# Patient Record
Sex: FEMALE | Race: WHITE | Employment: OTHER | URBAN - METROPOLITAN AREA
[De-identification: names, ages, dates, MRNs, and addresses within clinical notes are randomized per-mention and may not be internally consistent; named-entity substitution may affect disease eponyms.]

---

## 2022-03-22 ENCOUNTER — TELEPHONE (OUTPATIENT)
Dept: FAMILY MEDICINE CLINIC | Age: 87
End: 2022-03-22

## 2022-03-22 NOTE — TELEPHONE ENCOUNTER
Pt spouse is a new patient with [facility] and Catalino Mcwilliams referred her to the doctor. Wanting to know if the doctor can also accept her?     Thank you

## 2022-03-31 ENCOUNTER — OFFICE VISIT (OUTPATIENT)
Dept: FAMILY MEDICINE CLINIC | Age: 87
End: 2022-03-31
Payer: MEDICARE

## 2022-03-31 VITALS
HEIGHT: 56 IN | RESPIRATION RATE: 16 BRPM | SYSTOLIC BLOOD PRESSURE: 132 MMHG | HEART RATE: 64 BPM | BODY MASS INDEX: 22.54 KG/M2 | DIASTOLIC BLOOD PRESSURE: 80 MMHG | WEIGHT: 100.2 LBS | TEMPERATURE: 96.9 F | OXYGEN SATURATION: 97 %

## 2022-03-31 DIAGNOSIS — E78.00 HYPERCHOLESTEROLEMIA: ICD-10-CM

## 2022-03-31 DIAGNOSIS — G30.9 ALZHEIMER'S DISEASE WITH PARKINSON'S DISEASE (HCC): Primary | ICD-10-CM

## 2022-03-31 DIAGNOSIS — C50.912 MALIGNANT NEOPLASM OF LEFT FEMALE BREAST, UNSPECIFIED ESTROGEN RECEPTOR STATUS, UNSPECIFIED SITE OF BREAST (HCC): ICD-10-CM

## 2022-03-31 DIAGNOSIS — Z97.4 WEARS HEARING AID: ICD-10-CM

## 2022-03-31 DIAGNOSIS — F02.80 ALZHEIMER'S DISEASE WITH PARKINSON'S DISEASE (HCC): Primary | ICD-10-CM

## 2022-03-31 DIAGNOSIS — K59.09 OTHER CONSTIPATION: ICD-10-CM

## 2022-03-31 DIAGNOSIS — E03.9 ACQUIRED HYPOTHYROIDISM: ICD-10-CM

## 2022-03-31 DIAGNOSIS — I10 ESSENTIAL HYPERTENSION: ICD-10-CM

## 2022-03-31 DIAGNOSIS — Z86.73 HISTORY OF STROKE: ICD-10-CM

## 2022-03-31 DIAGNOSIS — G20 ALZHEIMER'S DISEASE WITH PARKINSON'S DISEASE (HCC): Primary | ICD-10-CM

## 2022-03-31 PROBLEM — I63.9 STROKE (CEREBRUM) (HCC): Status: ACTIVE | Noted: 2020-10-01

## 2022-03-31 PROBLEM — G20.A1 ALZHEIMER'S DISEASE WITH PARKINSON'S DISEASE (HCC): Status: ACTIVE | Noted: 2022-03-31

## 2022-03-31 PROBLEM — F41.8 DEPRESSION WITH ANXIETY: Status: ACTIVE | Noted: 2022-03-31

## 2022-03-31 PROCEDURE — G8427 DOCREV CUR MEDS BY ELIG CLIN: HCPCS | Performed by: FAMILY MEDICINE

## 2022-03-31 PROCEDURE — 1090F PRES/ABSN URINE INCON ASSESS: CPT | Performed by: FAMILY MEDICINE

## 2022-03-31 PROCEDURE — G8420 CALC BMI NORM PARAMETERS: HCPCS | Performed by: FAMILY MEDICINE

## 2022-03-31 PROCEDURE — G8484 FLU IMMUNIZE NO ADMIN: HCPCS | Performed by: FAMILY MEDICINE

## 2022-03-31 PROCEDURE — 4040F PNEUMOC VAC/ADMIN/RCVD: CPT | Performed by: FAMILY MEDICINE

## 2022-03-31 PROCEDURE — 1036F TOBACCO NON-USER: CPT | Performed by: FAMILY MEDICINE

## 2022-03-31 PROCEDURE — 1123F ACP DISCUSS/DSCN MKR DOCD: CPT | Performed by: FAMILY MEDICINE

## 2022-03-31 PROCEDURE — 99203 OFFICE O/P NEW LOW 30 MIN: CPT | Performed by: FAMILY MEDICINE

## 2022-03-31 RX ORDER — LISINOPRIL 20 MG/1
20 TABLET ORAL DAILY
COMMUNITY
Start: 2021-10-25

## 2022-03-31 RX ORDER — LEVOTHYROXINE SODIUM 0.05 MG/1
50 TABLET ORAL
COMMUNITY
Start: 2021-10-25

## 2022-03-31 RX ORDER — ATORVASTATIN CALCIUM 40 MG/1
40 TABLET, FILM COATED ORAL NIGHTLY
COMMUNITY
Start: 2021-09-29

## 2022-03-31 RX ORDER — CLOPIDOGREL BISULFATE 75 MG/1
75 TABLET ORAL DAILY
COMMUNITY
Start: 2021-10-07

## 2022-03-31 RX ORDER — B-COMPLEX WITH VITAMIN C
2 TABLET ORAL DAILY
COMMUNITY

## 2022-03-31 RX ORDER — TAMOXIFEN CITRATE 20 MG/1
20 TABLET ORAL DAILY
COMMUNITY

## 2022-03-31 RX ORDER — MEMANTINE HYDROCHLORIDE 10 MG/1
10 TABLET ORAL 2 TIMES DAILY
COMMUNITY
Start: 2021-10-07

## 2022-03-31 RX ORDER — VENLAFAXINE HYDROCHLORIDE 75 MG/1
75 CAPSULE, EXTENDED RELEASE ORAL DAILY
COMMUNITY
Start: 2021-10-25 | End: 2022-08-31

## 2022-03-31 RX ORDER — ATENOLOL 25 MG/1
25 TABLET ORAL DAILY
COMMUNITY
Start: 2021-10-25

## 2022-03-31 RX ORDER — SENNA PLUS 8.6 MG/1
2 TABLET ORAL DAILY
COMMUNITY

## 2022-03-31 SDOH — ECONOMIC STABILITY: FOOD INSECURITY: WITHIN THE PAST 12 MONTHS, THE FOOD YOU BOUGHT JUST DIDN'T LAST AND YOU DIDN'T HAVE MONEY TO GET MORE.: NEVER TRUE

## 2022-03-31 SDOH — ECONOMIC STABILITY: FOOD INSECURITY: WITHIN THE PAST 12 MONTHS, YOU WORRIED THAT YOUR FOOD WOULD RUN OUT BEFORE YOU GOT MONEY TO BUY MORE.: NEVER TRUE

## 2022-03-31 ASSESSMENT — PATIENT HEALTH QUESTIONNAIRE - PHQ9
SUM OF ALL RESPONSES TO PHQ9 QUESTIONS 1 & 2: 0
SUM OF ALL RESPONSES TO PHQ QUESTIONS 1-9: 0
2. FEELING DOWN, DEPRESSED OR HOPELESS: 0
1. LITTLE INTEREST OR PLEASURE IN DOING THINGS: 0
SUM OF ALL RESPONSES TO PHQ QUESTIONS 1-9: 0

## 2022-03-31 ASSESSMENT — SOCIAL DETERMINANTS OF HEALTH (SDOH): HOW HARD IS IT FOR YOU TO PAY FOR THE VERY BASICS LIKE FOOD, HOUSING, MEDICAL CARE, AND HEATING?: NOT HARD AT ALL

## 2022-04-20 ENCOUNTER — TELEPHONE (OUTPATIENT)
Dept: FAMILY MEDICINE CLINIC | Age: 87
End: 2022-04-20

## 2022-04-20 NOTE — TELEPHONE ENCOUNTER
Received CD of image from Krystle Leija. Advised patient and her  we could not read the disc.  said it was fine to get rid of disc. They could call JOSETTE TER[facility] for the report.

## 2022-05-11 ENCOUNTER — OFFICE VISIT (OUTPATIENT)
Dept: FAMILY MEDICINE CLINIC | Age: 87
End: 2022-05-11
Payer: MEDICARE

## 2022-05-11 VITALS
BODY MASS INDEX: 21.92 KG/M2 | DIASTOLIC BLOOD PRESSURE: 76 MMHG | WEIGHT: 101.6 LBS | HEART RATE: 69 BPM | RESPIRATION RATE: 12 BRPM | OXYGEN SATURATION: 96 % | SYSTOLIC BLOOD PRESSURE: 142 MMHG | TEMPERATURE: 96.8 F | HEIGHT: 57 IN

## 2022-05-11 DIAGNOSIS — I10 ESSENTIAL HYPERTENSION: ICD-10-CM

## 2022-05-11 DIAGNOSIS — G30.9 ALZHEIMER'S DISEASE WITH PARKINSON'S DISEASE (HCC): ICD-10-CM

## 2022-05-11 DIAGNOSIS — Z00.00 ROUTINE GENERAL MEDICAL EXAMINATION AT A HEALTH CARE FACILITY: Primary | ICD-10-CM

## 2022-05-11 DIAGNOSIS — G20 ALZHEIMER'S DISEASE WITH PARKINSON'S DISEASE (HCC): ICD-10-CM

## 2022-05-11 DIAGNOSIS — E78.00 HYPERCHOLESTEROLEMIA: ICD-10-CM

## 2022-05-11 DIAGNOSIS — E03.9 ACQUIRED HYPOTHYROIDISM: ICD-10-CM

## 2022-05-11 DIAGNOSIS — F02.80 ALZHEIMER'S DISEASE WITH PARKINSON'S DISEASE (HCC): ICD-10-CM

## 2022-05-11 DIAGNOSIS — Z97.4 WEARS HEARING AID: ICD-10-CM

## 2022-05-11 DIAGNOSIS — Z86.73 HISTORY OF STROKE: ICD-10-CM

## 2022-05-11 DIAGNOSIS — C50.912 MALIGNANT NEOPLASM OF LEFT FEMALE BREAST, UNSPECIFIED ESTROGEN RECEPTOR STATUS, UNSPECIFIED SITE OF BREAST (HCC): ICD-10-CM

## 2022-05-11 PROCEDURE — 99213 OFFICE O/P EST LOW 20 MIN: CPT | Performed by: FAMILY MEDICINE

## 2022-05-11 PROCEDURE — 4040F PNEUMOC VAC/ADMIN/RCVD: CPT | Performed by: FAMILY MEDICINE

## 2022-05-11 PROCEDURE — G8427 DOCREV CUR MEDS BY ELIG CLIN: HCPCS | Performed by: FAMILY MEDICINE

## 2022-05-11 PROCEDURE — G8420 CALC BMI NORM PARAMETERS: HCPCS | Performed by: FAMILY MEDICINE

## 2022-05-11 PROCEDURE — 1123F ACP DISCUSS/DSCN MKR DOCD: CPT | Performed by: FAMILY MEDICINE

## 2022-05-11 PROCEDURE — 1090F PRES/ABSN URINE INCON ASSESS: CPT | Performed by: FAMILY MEDICINE

## 2022-05-11 PROCEDURE — 1036F TOBACCO NON-USER: CPT | Performed by: FAMILY MEDICINE

## 2022-05-11 PROCEDURE — G0439 PPPS, SUBSEQ VISIT: HCPCS | Performed by: FAMILY MEDICINE

## 2022-05-11 ASSESSMENT — PATIENT HEALTH QUESTIONNAIRE - PHQ9
2. FEELING DOWN, DEPRESSED OR HOPELESS: 0
SUM OF ALL RESPONSES TO PHQ QUESTIONS 1-9: 0
1. LITTLE INTEREST OR PLEASURE IN DOING THINGS: 0
SUM OF ALL RESPONSES TO PHQ QUESTIONS 1-9: 0
SUM OF ALL RESPONSES TO PHQ QUESTIONS 1-9: 0
SUM OF ALL RESPONSES TO PHQ9 QUESTIONS 1 & 2: 0
SUM OF ALL RESPONSES TO PHQ QUESTIONS 1-9: 0

## 2022-05-11 ASSESSMENT — LIFESTYLE VARIABLES
HAVE YOU OR SOMEONE ELSE BEEN INJURED AS A RESULT OF YOUR DRINKING: 0
HOW OFTEN DO YOU HAVE A DRINK CONTAINING ALCOHOL: 4 OR MORE TIMES A WEEK
HOW OFTEN DURING THE LAST YEAR HAVE YOU BEEN UNABLE TO REMEMBER WHAT HAPPENED THE NIGHT BEFORE BECAUSE YOU HAD BEEN DRINKING: 0
HOW OFTEN DURING THE LAST YEAR HAVE YOU NEEDED AN ALCOHOLIC DRINK FIRST THING IN THE MORNING TO GET YOURSELF GOING AFTER A NIGHT OF HEAVY DRINKING: 0
HOW MANY STANDARD DRINKS CONTAINING ALCOHOL DO YOU HAVE ON A TYPICAL DAY: 1 OR 2
HOW OFTEN DURING THE LAST YEAR HAVE YOU FOUND THAT YOU WERE NOT ABLE TO STOP DRINKING ONCE YOU HAD STARTED: 0
HOW OFTEN DURING THE LAST YEAR HAVE YOU HAD A FEELING OF GUILT OR REMORSE AFTER DRINKING: 0
HOW OFTEN DURING THE LAST YEAR HAVE YOU FAILED TO DO WHAT WAS NORMALLY EXPECTED FROM YOU BECAUSE OF DRINKING: 0
HAS A RELATIVE, FRIEND, DOCTOR, OR ANOTHER HEALTH PROFESSIONAL EXPRESSED CONCERN ABOUT YOUR DRINKING OR SUGGESTED YOU CUT DOWN: 0

## 2022-05-11 NOTE — PROGRESS NOTES
Medicare Annual Wellness Visit    Deon Bazan is here for Medicare AWV    Assessment & Plan   1. Routine general medical examination at a health care facility  - CBC, complete metabolic panel   - TSH   - Lipid profile. 2. Acquired hypothyroidism  - Stable. Continue Synthroid 0 .050 mg qd.   - TSH, free T4.     3. Essential hypertension  - Controlled. Continue Atenolol 25 mg qd, Lisinopril 20 mg qd and low sodium diet. 4. Hypercholesterolemia  - Controlled. Continue Lipitor 40 mg qd and low cholesterol diet. 5. Alzheimer's disease with Parkinson's disease ([facility].)  - Stable. Followed by Neurologist.  Discussed changing to [facility] if prefers.    - Continue Sinemet tid and Namenda bid. 6. Malignant neoplasm of left female breast, unspecified estrogen receptor status, unspecified site of breast ([facility].)  - Stable. 7. History of stroke  - Stable. Continue Plavix qd and Lipitor 40 mg qd. 8. Wears hearing aid  - Stable. Follow up 6 months/ prn. Recommendations for Preventive Services Due: see orders and patient instructions/AVS.  Recommended screening schedule for the next 5-10 years is provided to the patient in written form: see Patient Instructions/AVS.     No follow-ups on file. Subjective   The following acute and/or chronic problems were also addressed today:      M[facility] to [place] [facility]. Doing well overall with new living situation. Patient's complete Health Risk Assessment and screening values have been reviewed and are found in Flowsheets. The following problems were reviewed today and where indicated follow up appointments were made and/or referrals ordered. Positive Risk Factor Screenings with Interventions:    Fall Risk:        Fall Risk Interventions:    · Recommended stretching and increased activity    Cognitive:   Words recalled: 0 Words Recalled  Clock Drawing Test (CDT): (!) Abnormal  Total Score Interpretation: Abnormal Mini-Cog  Did the patient refuse to take the cognition test?: No    Cognitive Impairment Interventions:  · Patient is followed by Neurologist, Dr. Vargas Macias and ACP:       [address]  Living Will ACP-Advance Directive ACP-Power of     Not on File Not on File Not on File Not on File      General Health Risk Interventions:  · Has Living Will and will bring in a copy . Objective   Vitals:    05/11/22 1310   BP: (!) 142/76   Pulse: 69   Resp: 12   Temp: 96.8 °F (36 °C)   TempSrc: Temporal   SpO2: 96%   Weight: 101 lb 9.6 oz (46.1 kg)   Height: 4' 9\" (1.448 m)      Body mass index is 21.99 kg/m².         General Appearance: alert and oriented to person, place and time, well developed and well- nourished, in no acute distress  Skin: warm and dry, no rash or erythema  Head: normocephalic and atraumatic  Eyes: pupils equal, round, and reactive to light, extraocular eye movements intact, conjunctivae normal  ENT: tympanic membrane, external ear and ear canal normal bilaterally, nose without deformity, nasal mucosa and turbinates normal without polyps  Neck: supple and non-tender without mass, no thyromegaly or thyroid nodules, no cervical lymphadenopathy  Pulmonary/Chest: clear to auscultation bilaterally- no wheezes, rales or rhonchi, normal air movement, no respiratory distress  Cardiovascular: normal rate, regular rhythm, normal S1 and S2, no murmurs, rubs, clicks, or gallops, distal pulses intact, no carotid bruits  Abdomen: soft, non-tender, non-distended, normal bowel sounds, no masses or organomegaly  Extremities: no cyanosis, clubbing or edema  Musculoskeletal: normal range of motion, no joint swelling, deformity or tenderness  Neurologic: reflexes normal and symmetric, no cranial nerve deficit, gait, coordination and speech normal       Allergies   Allergen Reactions    Codeine Nausea Only     Prior to Visit Medications    Medication Sig Taking?  Authorizing Provider   atenolol (TENORMIN) 25 MG tablet Take 25 mg by mouth daily Yes Historical Provider, MD   atorvastatin (LIPITOR) 40 MG tablet Take 40 mg by mouth nightly Yes Historical Provider, MD   Calcium Carbonate-Vitamin D (OYSTER SHELL CALCIUM/D) 500-200 MG-UNIT TABS Take 2 tablets by mouth daily Yes Historical Provider, MD   carbidopa-levodopa (SINEMET)  MG per tablet Take 1 tablet by mouth 3 times daily Yes Historical Provider, MD   clopidogrel (PLAVIX) 75 MG tablet Take 75 mg by mouth daily Yes Historical Provider, MD   levothyroxine (SYNTHROID) 50 MCG tablet Take 50 mcg by mouth every morning (before breakfast) Yes Historical Provider, MD   lisinopril (PRINIVIL;ZESTRIL) 20 MG tablet Take 20 mg by mouth daily Yes Historical Provider, MD   memantine (NAMENDA) 10 MG tablet Take 10 mg by mouth 2 times daily Yes Historical Provider, MD   senna (SENOKOT) 8.6 MG tablet Take 2 tablets by mouth daily Yes Historical Provider, MD   tamoxifen (NOLVADEX) 20 MG tablet Take 20 mg by mouth daily Yes Historical Provider, MD   venlafaxine (EFFEXOR XR) 75 MG extended release capsule Take 75 mg by mouth daily Yes Historical Provider, MD   Multiple Vitamins-Minerals (MULTIVITAMIN ADULTS 50+ PO) Take by mouth Yes Historical Provider, MD [place] (Including outside providers/suppliers regularly involved in providing care):   Patient Care Team:  Aston Fabian MD as PCP - General (Family Medicine)  Aston Fabian MD as PCP - [facility] Empaneled Provider    Reviewed and updated this visit:  Tobacco  Allergies  Meds  Problems  Med Hx  Surg Hx  Soc Hx  Fam Hx

## 2022-05-12 ENCOUNTER — TELEPHONE (OUTPATIENT)
Dept: FAMILY MEDICINE CLINIC | Age: 87
End: 2022-05-12

## 2022-05-12 DIAGNOSIS — E03.9 ACQUIRED HYPOTHYROIDISM: ICD-10-CM

## 2022-05-12 DIAGNOSIS — E78.00 HYPERCHOLESTEROLEMIA: ICD-10-CM

## 2022-05-12 DIAGNOSIS — I10 ESSENTIAL HYPERTENSION: ICD-10-CM

## 2022-05-12 DIAGNOSIS — C50.912 MALIGNANT NEOPLASM OF LEFT FEMALE BREAST, UNSPECIFIED ESTROGEN RECEPTOR STATUS, UNSPECIFIED SITE OF BREAST (HCC): ICD-10-CM

## 2022-05-12 LAB
A/G RATIO: 1.7 (ref 1.1–2.2)
ALBUMIN SERPL-MCNC: 4.3 G/DL (ref 3.4–5)
ALP BLD-CCNC: 37 U/L (ref 40–129)
ALT SERPL-CCNC: 14 U/L (ref 10–40)
ANION GAP SERPL CALCULATED.3IONS-SCNC: 14 MMOL/L (ref 3–16)
AST SERPL-CCNC: 17 U/L (ref 15–37)
BILIRUB SERPL-MCNC: 0.5 MG/DL (ref 0–1)
BUN BLDV-MCNC: 13 MG/DL (ref 7–20)
CALCIUM SERPL-MCNC: 9.3 MG/DL (ref 8.3–10.6)
CHLORIDE BLD-SCNC: 103 MMOL/L (ref 99–110)
CHOLESTEROL, TOTAL: 151 MG/DL (ref 0–199)
CO2: 24 MMOL/L (ref 21–32)
CREAT SERPL-MCNC: 0.9 MG/DL (ref 0.6–1.2)
GFR AFRICAN AMERICAN: >60
GFR NON-AFRICAN AMERICAN: 59
GLUCOSE BLD-MCNC: 91 MG/DL (ref 70–99)
HCT VFR BLD CALC: 37 % (ref 36–48)
HDLC SERPL-MCNC: 97 MG/DL (ref 40–60)
HEMOGLOBIN: 12.8 G/DL (ref 12–16)
LDL CHOLESTEROL CALCULATED: 40 MG/DL
MCH RBC QN AUTO: 34.4 PG (ref 26–34)
MCHC RBC AUTO-ENTMCNC: 34.6 G/DL (ref 31–36)
MCV RBC AUTO: 99.5 FL (ref 80–100)
PDW BLD-RTO: 14.2 % (ref 12.4–15.4)
PLATELET # BLD: 216 K/UL (ref 135–450)
PMV BLD AUTO: 7.4 FL (ref 5–10.5)
POTASSIUM SERPL-SCNC: 4.1 MMOL/L (ref 3.5–5.1)
RBC # BLD: 3.72 M/UL (ref 4–5.2)
SODIUM BLD-SCNC: 141 MMOL/L (ref 136–145)
T4 FREE: 1.3 NG/DL (ref 0.9–1.8)
TOTAL PROTEIN: 6.8 G/DL (ref 6.4–8.2)
TRIGL SERPL-MCNC: 68 MG/DL (ref 0–150)
TSH SERPL DL<=0.05 MIU/L-ACNC: 1.63 UIU/ML (ref 0.27–4.2)
VLDLC SERPL CALC-MCNC: 14 MG/DL
WBC # BLD: 6 K/UL (ref 4–11)

## 2022-05-12 NOTE — TELEPHONE ENCOUNTER
Give information for the Dermatology group- Dr. Cari Boo ,etc.  Please verify what the issue is ? Skin check / lesion ? Location?

## 2022-05-12 NOTE — TELEPHONE ENCOUNTER
----- Message from Mary Klein sent at 5/12/2022  4:19 PM EDT -----  Subject: Referral Request    QUESTIONS   Reason for referral request? Referral for Dermatology is needed   Has the physician seen you for this condition before? No   Preferred Specialist (if applicable)? Do you already have an appointment scheduled? No  Additional Information for Provider?   ---------------------------------------------------------------------------  --------------  CALL BACK INFO  What is the best way for the office to contact you? OK to leave message on   voicemail  Preferred Call Back Phone Number? [phone]  ---------------------------------------------------------------------------  --------------  SCRIPT ANSWERS  Relationship to Patient? Other  Representative Name? nanette  Is the Representative on the appropriate HIPAA document in Epic?  Yes

## 2022-05-17 NOTE — TELEPHONE ENCOUNTER
Number is ringing busy, called Shruti (on hippa) and papito for her to have Saritha or Wing Byrne call us back so we can go over labs and this message for both.      Thank you

## 2022-06-02 ENCOUNTER — TELEPHONE (OUTPATIENT)
Dept: FAMILY MEDICINE CLINIC | Age: 87
End: 2022-06-02

## 2022-06-02 NOTE — TELEPHONE ENCOUNTER
Add fiber supplement once per day ( Metamucil, Citrucel or Fibercon) . If not improving, add Miralax 1 capful per day .

## 2022-06-02 NOTE — TELEPHONE ENCOUNTER
Nichole Paulson called with her  and said the Jared Jacob is not working very well. She continues to have issues with Constipation. Is there something they can switch to?  Or maybe add on?

## 2022-07-11 ENCOUNTER — TELEPHONE (OUTPATIENT)
Dept: FAMILY MEDICINE CLINIC | Age: 87
End: 2022-07-11

## 2022-07-11 NOTE — TELEPHONE ENCOUNTER
Patient's  called for a refill for his wife.     venlafaxine (EFFEXOR XR) 75 MG extended release capsule [[id number]]     Order Details  Dose: 75 mg Route: Oral Frequency: DAILY   Dispense Quantity: -- Refills: --          Sig: Take 75 mg by mouth daily           [facility] [id number] [place], [address] [facility] [address] 3 - P [phone] - F [phone]     Last ov: 05/11/2022  Last labs: 05/12/2022

## 2022-07-12 RX ORDER — VENLAFAXINE HYDROCHLORIDE 75 MG/1
75 CAPSULE, EXTENDED RELEASE ORAL DAILY
Qty: 30 CAPSULE | Refills: 0 | Status: CANCELLED | OUTPATIENT
Start: 2022-07-12

## 2022-07-12 RX ORDER — VENLAFAXINE HYDROCHLORIDE 75 MG/1
75 CAPSULE, EXTENDED RELEASE ORAL DAILY
Qty: 90 CAPSULE | Refills: 1 | Status: SHIPPED | OUTPATIENT
Start: 2022-07-12

## 2022-08-10 ENCOUNTER — OFFICE VISIT (OUTPATIENT)
Dept: FAMILY MEDICINE CLINIC | Age: 87
End: 2022-08-10
Payer: MEDICARE

## 2022-08-10 VITALS
WEIGHT: 100.5 LBS | HEIGHT: 57 IN | BODY MASS INDEX: 21.68 KG/M2 | SYSTOLIC BLOOD PRESSURE: 137 MMHG | DIASTOLIC BLOOD PRESSURE: 82 MMHG | TEMPERATURE: 97.5 F | HEART RATE: 73 BPM | OXYGEN SATURATION: 95 %

## 2022-08-10 DIAGNOSIS — G20 ALZHEIMER'S DISEASE WITH PARKINSON'S DISEASE (HCC): ICD-10-CM

## 2022-08-10 DIAGNOSIS — G30.9 ALZHEIMER'S DISEASE WITH PARKINSON'S DISEASE (HCC): ICD-10-CM

## 2022-08-10 DIAGNOSIS — R10.31 RIGHT LOWER QUADRANT ABDOMINAL PAIN: Primary | ICD-10-CM

## 2022-08-10 DIAGNOSIS — F02.80 ALZHEIMER'S DISEASE WITH PARKINSON'S DISEASE (HCC): ICD-10-CM

## 2022-08-10 DIAGNOSIS — K59.00 CONSTIPATION, UNSPECIFIED CONSTIPATION TYPE: ICD-10-CM

## 2022-08-10 PROCEDURE — G8427 DOCREV CUR MEDS BY ELIG CLIN: HCPCS | Performed by: FAMILY MEDICINE

## 2022-08-10 PROCEDURE — 99214 OFFICE O/P EST MOD 30 MIN: CPT | Performed by: FAMILY MEDICINE

## 2022-08-10 PROCEDURE — 1123F ACP DISCUSS/DSCN MKR DOCD: CPT | Performed by: FAMILY MEDICINE

## 2022-08-10 PROCEDURE — 1036F TOBACCO NON-USER: CPT | Performed by: FAMILY MEDICINE

## 2022-08-10 PROCEDURE — 1090F PRES/ABSN URINE INCON ASSESS: CPT | Performed by: FAMILY MEDICINE

## 2022-08-10 PROCEDURE — G8420 CALC BMI NORM PARAMETERS: HCPCS | Performed by: FAMILY MEDICINE

## 2022-08-10 RX ORDER — DICYCLOMINE HCL 20 MG
20 TABLET ORAL 4 TIMES DAILY PRN
Qty: 40 TABLET | Refills: 0 | Status: SHIPPED | OUTPATIENT
Start: 2022-08-10 | End: 2022-08-26 | Stop reason: SDUPTHER

## 2022-08-10 ASSESSMENT — PATIENT HEALTH QUESTIONNAIRE - PHQ9
SUM OF ALL RESPONSES TO PHQ QUESTIONS 1-9: 0
SUM OF ALL RESPONSES TO PHQ QUESTIONS 1-9: 0
1. LITTLE INTEREST OR PLEASURE IN DOING THINGS: 0
2. FEELING DOWN, DEPRESSED OR HOPELESS: 0
SUM OF ALL RESPONSES TO PHQ QUESTIONS 1-9: 0
SUM OF ALL RESPONSES TO PHQ9 QUESTIONS 1 & 2: 0
SUM OF ALL RESPONSES TO PHQ QUESTIONS 1-9: 0

## 2022-08-26 ENCOUNTER — TELEPHONE (OUTPATIENT)
Dept: FAMILY MEDICINE CLINIC | Age: 87
End: 2022-08-26

## 2022-08-26 DIAGNOSIS — R10.31 RIGHT LOWER QUADRANT ABDOMINAL PAIN: ICD-10-CM

## 2022-08-26 RX ORDER — DICYCLOMINE HCL 20 MG
20 TABLET ORAL 4 TIMES DAILY PRN
Qty: 40 TABLET | Refills: 0 | Status: SHIPPED | OUTPATIENT
Start: 2022-08-26 | End: 2022-09-07 | Stop reason: SDUPTHER

## 2022-08-26 NOTE — TELEPHONE ENCOUNTER
Patient's daughter Blake Staley is very concerned about her mom's condition and would like to discuss some things she's observed with Dr. Lupe Crowell. Please call her concerning this matter.  [phone]

## 2022-08-26 NOTE — TELEPHONE ENCOUNTER
Condition:  Was in office x3 weeks ago, Parkinson, dementia, stomach pain, & living condition not so well. Shruti would like to know where to start to help improve pt health and living. [place]itiano Batista [place], but she expressed greatly on her mom concern. Shruti believe pt  is not able to help take care of pt properly. Shruti stated that she had to keep pushing pt through phone calls to take a shower, in the result pt fell in the showers. Pt  is unable to assist pt properly. Shruti also feels that both pt and her  are unable to let the doctor that they really need assistants with taking medication and self care. Shruti would like to know if Dr. Aspen Murdock can give her a call to help her go in the right direction on pt overall condition.

## 2022-08-26 NOTE — TELEPHONE ENCOUNTER
Requested Prescriptions     Pending Prescriptions Disp Refills    dicyclomine (BENTYL) 20 MG tablet 40 tablet 0     Sig: Take 1 tablet by mouth 4 times daily as needed (abdominal cramping.)     Last ov 8/10/2022  Last labs 5/12/22

## 2022-08-26 NOTE — TELEPHONE ENCOUNTER
----- Message from Home Velasquez sent at 8/26/2022  1:51 PM EDT -----  Subject: Refill Request    QUESTIONS  Name of Medication? dicyclomine (BENTYL) 20 MG tablet  Patient-reported dosage and instructions? 20mg as needed   How many days do you have left? 6  Preferred Pharmacy? [address]  Pharmacy phone number (if available)? [phone]  ---------------------------------------------------------------------------  --------------  Daryle Haver INFO  What is the best way for the office to contact you? OK to leave message on   voicemail  Preferred Call Back Phone Number? [phone]  ---------------------------------------------------------------------------  --------------  SCRIPT ANSWERS  Relationship to Patient? Other  Representative Name? Erickson Cm   Is the Representative on the appropriate HIPAA document in Epic?  Yes

## 2022-08-26 NOTE — TELEPHONE ENCOUNTER
Pt daughter would like a call from pcp personally to discuss pt, Clara Harris is very concerned about her moms condition

## 2022-08-31 ENCOUNTER — OFFICE VISIT (OUTPATIENT)
Dept: FAMILY MEDICINE CLINIC | Age: 87
End: 2022-08-31
Payer: MEDICARE

## 2022-08-31 VITALS
BODY MASS INDEX: 21.12 KG/M2 | SYSTOLIC BLOOD PRESSURE: 138 MMHG | OXYGEN SATURATION: 98 % | HEART RATE: 59 BPM | TEMPERATURE: 97.5 F | WEIGHT: 97.6 LBS | RESPIRATION RATE: 20 BRPM | DIASTOLIC BLOOD PRESSURE: 76 MMHG

## 2022-08-31 DIAGNOSIS — G20 ALZHEIMER'S DISEASE WITH PARKINSON'S DISEASE (HCC): ICD-10-CM

## 2022-08-31 DIAGNOSIS — F02.80 ALZHEIMER'S DISEASE WITH PARKINSON'S DISEASE (HCC): ICD-10-CM

## 2022-08-31 DIAGNOSIS — R35.1 NOCTURIA: ICD-10-CM

## 2022-08-31 DIAGNOSIS — E78.00 HYPERCHOLESTEROLEMIA: ICD-10-CM

## 2022-08-31 DIAGNOSIS — R10.9 RIGHT SIDED ABDOMINAL PAIN: Primary | ICD-10-CM

## 2022-08-31 DIAGNOSIS — I10 ESSENTIAL HYPERTENSION: ICD-10-CM

## 2022-08-31 DIAGNOSIS — G30.9 ALZHEIMER'S DISEASE WITH PARKINSON'S DISEASE (HCC): ICD-10-CM

## 2022-08-31 DIAGNOSIS — R10.9 RIGHT SIDED ABDOMINAL PAIN: ICD-10-CM

## 2022-08-31 DIAGNOSIS — Z12.11 COLON CANCER SCREENING: ICD-10-CM

## 2022-08-31 DIAGNOSIS — K59.00 CONSTIPATION, UNSPECIFIED CONSTIPATION TYPE: ICD-10-CM

## 2022-08-31 DIAGNOSIS — E03.9 ACQUIRED HYPOTHYROIDISM: ICD-10-CM

## 2022-08-31 LAB
A/G RATIO: 1.5 (ref 1.1–2.2)
ALBUMIN SERPL-MCNC: 4.1 G/DL (ref 3.4–5)
ALP BLD-CCNC: 32 U/L (ref 40–129)
ALT SERPL-CCNC: 13 U/L (ref 10–40)
ANION GAP SERPL CALCULATED.3IONS-SCNC: 13 MMOL/L (ref 3–16)
AST SERPL-CCNC: 18 U/L (ref 15–37)
BASOPHILS ABSOLUTE: 0.1 K/UL (ref 0–0.2)
BASOPHILS RELATIVE PERCENT: 0.9 %
BILIRUB SERPL-MCNC: <0.2 MG/DL (ref 0–1)
BUN BLDV-MCNC: 14 MG/DL (ref 7–20)
CALCIUM SERPL-MCNC: 9.4 MG/DL (ref 8.3–10.6)
CHLORIDE BLD-SCNC: 101 MMOL/L (ref 99–110)
CO2: 27 MMOL/L (ref 21–32)
CREAT SERPL-MCNC: 1.2 MG/DL (ref 0.6–1.2)
EOSINOPHILS ABSOLUTE: 0.1 K/UL (ref 0–0.6)
EOSINOPHILS RELATIVE PERCENT: 1.1 %
GFR AFRICAN AMERICAN: 51
GFR NON-AFRICAN AMERICAN: 42
GLUCOSE BLD-MCNC: 90 MG/DL (ref 70–99)
HCT VFR BLD CALC: 37.9 % (ref 36–48)
HEMOGLOBIN: 13.1 G/DL (ref 12–16)
LYMPHOCYTES ABSOLUTE: 1.8 K/UL (ref 1–5.1)
LYMPHOCYTES RELATIVE PERCENT: 30.4 %
MCH RBC QN AUTO: 34.6 PG (ref 26–34)
MCHC RBC AUTO-ENTMCNC: 34.5 G/DL (ref 31–36)
MCV RBC AUTO: 100.2 FL (ref 80–100)
MONOCYTES ABSOLUTE: 0.4 K/UL (ref 0–1.3)
MONOCYTES RELATIVE PERCENT: 7 %
NEUTROPHILS ABSOLUTE: 3.6 K/UL (ref 1.7–7.7)
NEUTROPHILS RELATIVE PERCENT: 60.6 %
PDW BLD-RTO: 13.8 % (ref 12.4–15.4)
PLATELET # BLD: 221 K/UL (ref 135–450)
PMV BLD AUTO: 7.8 FL (ref 5–10.5)
POTASSIUM SERPL-SCNC: 4.1 MMOL/L (ref 3.5–5.1)
RBC # BLD: 3.79 M/UL (ref 4–5.2)
SEDIMENTATION RATE, ERYTHROCYTE: 7 MM/HR (ref 0–30)
SODIUM BLD-SCNC: 141 MMOL/L (ref 136–145)
TOTAL PROTEIN: 6.8 G/DL (ref 6.4–8.2)
WBC # BLD: 6 K/UL (ref 4–11)

## 2022-08-31 PROCEDURE — 1036F TOBACCO NON-USER: CPT | Performed by: FAMILY MEDICINE

## 2022-08-31 PROCEDURE — G8420 CALC BMI NORM PARAMETERS: HCPCS | Performed by: FAMILY MEDICINE

## 2022-08-31 PROCEDURE — 1123F ACP DISCUSS/DSCN MKR DOCD: CPT | Performed by: FAMILY MEDICINE

## 2022-08-31 PROCEDURE — G8427 DOCREV CUR MEDS BY ELIG CLIN: HCPCS | Performed by: FAMILY MEDICINE

## 2022-08-31 PROCEDURE — 1090F PRES/ABSN URINE INCON ASSESS: CPT | Performed by: FAMILY MEDICINE

## 2022-08-31 PROCEDURE — 99214 OFFICE O/P EST MOD 30 MIN: CPT | Performed by: FAMILY MEDICINE

## 2022-08-31 PROCEDURE — 82274 ASSAY TEST FOR BLOOD FECAL: CPT | Performed by: FAMILY MEDICINE

## 2022-08-31 NOTE — PROGRESS NOTES
Patient is here for right sided abdominal pain with radiation to right back X months. Unsure what aggravates the pain. Urinary frequency and dysuria. Get up 2-3 times per night. Daughter is on the phone during her appointment . H/o IBS. Last colonoscopy ? ? Per daughter. She tried Bentyl initially prn and then started taking it once per day . Her bowel movements are once per day usually. Takes Senna qd for 3-4 months. Her last bout of abdominal pain was this am 5/10 before a bowel movement . After her bowel movement the pain improved. She has taken Miralax qd in the past.  She is not going to dinner 3-4 nights per week due to cramping/ pain. Only eating 2 meals per day. There will be an assessment with [facility] tomorrow to assess any additional needs. She fell in shower last week. Daughter is inquiring about neurologist.          Review of Systems    ROS: All other systems were reviewed and are negative . Patient's allergies and medications were reviewed. Patient's past medical, surgical, social , and family history were reviewed. Wt Readings from Last 3 Encounters:   08/31/22 97 lb 9.6 oz (44.3 kg)   08/10/22 100 lb 8 oz (45.6 kg)   05/11/22 101 lb 9.6 oz (46.1 kg)       No results found for this visit on 08/31/22. OBJECTIVE:  /76   Pulse 59   Temp 97.5 °F (36.4 °C)   Resp 20   Wt 97 lb 9.6 oz (44.3 kg)   SpO2 98%   BMI 21.12 kg/m²     Physical Exam    General: NAD, cooperative, alert and oriented X 3. Mood / affect is good. good insight. well hydrated. Neck : no lymphadenopathy, supple, FROM  CV: Regular rate and rhythm , no murmurs/ rub/ gallop. No edema. Lungs : CTA bilaterally, breathing comfortably  Abdomen: positive bowel sounds, soft , non tender, non distended. No hepatosplenomegaly. No CVA tenderness. Skin: no rashes. Non tender. ASSESSMENT/  PLAN:  1. Right sided abdominal pain  - Comprehensive Metabolic Panel;  Future  - Sedimentation Rate; Future  - CBC with Auto Differential; Future  - Urinalysis with Microscopic- at lab as not able to give specimen today  - Keep log of flares, including relationship to bowel movements and diet. 2. Constipation, unspecified constipation type  - Continue fluids - water and daily dietary fiber.   - Add Miralax qd. 3. Alzheimer's disease with Parkinson's disease ([facility]  - External Referral To Neurology   - Continue Sinemet tid and Namenda 10 mg bid. 4. Nocturia  - Avoid caffeine/ alcohol.   - Stop drinking fluids by 6-7 pm. Monitor. 5. Essential hypertension  - Controlled. Continue Atenolol 25 mg qd, Lisinopril 20 mg qd and low sodium diet. 6. Acquired hypothyroidism  - Stable. Continue Synthroid 0.050 mg qd.     7. Hypercholesterolemia  - Controlled. Continue Lipitor 40 mg qd and low cholesterol diet. Follow up 4 -6 weeks/ prn.

## 2022-09-07 DIAGNOSIS — R10.31 RIGHT LOWER QUADRANT ABDOMINAL PAIN: ICD-10-CM

## 2022-09-07 RX ORDER — DICYCLOMINE HCL 20 MG
20 TABLET ORAL 4 TIMES DAILY PRN
Qty: 40 TABLET | Refills: 1 | Status: SHIPPED | OUTPATIENT
Start: 2022-09-07 | End: 2022-09-28 | Stop reason: SDUPTHER

## 2022-09-07 NOTE — TELEPHONE ENCOUNTER
PT need refill     dicyclomine (BENTYL) 20 MG tablet [[id number]]     Order Details  Dose: 20 mg Route: Oral Frequency: 4 TIMES DAILY PRN for abdominal cramping.    Dispense Quantity: 40 tablet Refills: 0          Sig: Take 1 tablet by mouth 4 times daily as needed (abdominal cramping.)         Start Date: 08/26/22 End Date: --   Written Date: 08/26/22 Expiration Date: 08/26/23       Diagnosis Association: Right lower quadrant abdominal pain (R10.31)   Original Order:  dicyclomine (BENTYL) 20 MG tablet [[id number]]     [facility] [id number] - [address] [facility], [address] [place] [address] 3 - P [phone] Sari Ashby [phone]    Last ov 8/31/22  Last labs 8/31/22  Next ov N/A    Thanks  Kellie Jacobo

## 2022-09-07 NOTE — TELEPHONE ENCOUNTER
Requested Prescriptions     Pending Prescriptions Disp Refills    dicyclomine (BENTYL) 20 MG tablet 40 tablet 0     Sig: Take 1 tablet by mouth 4 times daily as needed (abdominal cramping.)     Last ov 8/31/22  Last lab 8/31/22  Next ov 9/28/22

## 2022-09-15 LAB
CONTROL: NORMAL
HEMOCCULT STL QL: NORMAL

## 2022-09-28 ENCOUNTER — OFFICE VISIT (OUTPATIENT)
Dept: FAMILY MEDICINE CLINIC | Age: 87
End: 2022-09-28
Payer: MEDICARE

## 2022-09-28 VITALS
SYSTOLIC BLOOD PRESSURE: 130 MMHG | TEMPERATURE: 97.1 F | BODY MASS INDEX: 21.64 KG/M2 | HEART RATE: 60 BPM | OXYGEN SATURATION: 96 % | WEIGHT: 100 LBS | DIASTOLIC BLOOD PRESSURE: 60 MMHG | RESPIRATION RATE: 14 BRPM

## 2022-09-28 DIAGNOSIS — G30.9 ALZHEIMER'S DISEASE WITH PARKINSON'S DISEASE (HCC): ICD-10-CM

## 2022-09-28 DIAGNOSIS — I10 ESSENTIAL HYPERTENSION: ICD-10-CM

## 2022-09-28 DIAGNOSIS — K58.9 IRRITABLE BOWEL SYNDROME, UNSPECIFIED TYPE: ICD-10-CM

## 2022-09-28 DIAGNOSIS — Z23 NEEDS FLU SHOT: ICD-10-CM

## 2022-09-28 DIAGNOSIS — R42 LIGHTHEADED: ICD-10-CM

## 2022-09-28 DIAGNOSIS — R10.31 RIGHT LOWER QUADRANT ABDOMINAL PAIN: Primary | ICD-10-CM

## 2022-09-28 DIAGNOSIS — E03.9 ACQUIRED HYPOTHYROIDISM: ICD-10-CM

## 2022-09-28 DIAGNOSIS — F02.80 ALZHEIMER'S DISEASE WITH PARKINSON'S DISEASE (HCC): ICD-10-CM

## 2022-09-28 DIAGNOSIS — G20 ALZHEIMER'S DISEASE WITH PARKINSON'S DISEASE (HCC): ICD-10-CM

## 2022-09-28 PROCEDURE — G8427 DOCREV CUR MEDS BY ELIG CLIN: HCPCS | Performed by: FAMILY MEDICINE

## 2022-09-28 PROCEDURE — 1036F TOBACCO NON-USER: CPT | Performed by: FAMILY MEDICINE

## 2022-09-28 PROCEDURE — G8420 CALC BMI NORM PARAMETERS: HCPCS | Performed by: FAMILY MEDICINE

## 2022-09-28 PROCEDURE — 1123F ACP DISCUSS/DSCN MKR DOCD: CPT | Performed by: FAMILY MEDICINE

## 2022-09-28 PROCEDURE — 90694 VACC AIIV4 NO PRSRV 0.5ML IM: CPT | Performed by: FAMILY MEDICINE

## 2022-09-28 PROCEDURE — 1090F PRES/ABSN URINE INCON ASSESS: CPT | Performed by: FAMILY MEDICINE

## 2022-09-28 PROCEDURE — G0008 ADMIN INFLUENZA VIRUS VAC: HCPCS | Performed by: FAMILY MEDICINE

## 2022-09-28 PROCEDURE — 99214 OFFICE O/P EST MOD 30 MIN: CPT | Performed by: FAMILY MEDICINE

## 2022-09-28 RX ORDER — DICYCLOMINE HCL 20 MG
20 TABLET ORAL 4 TIMES DAILY PRN
Qty: 90 TABLET | Refills: 2 | Status: SHIPPED | OUTPATIENT
Start: 2022-09-28

## 2022-09-28 NOTE — PROGRESS NOTES
tablet by mouth 4 times daily as needed (abdominal cramping.)  Dispense: 90 tablet; Refill: 2  - Weight is stable. 2. Irritable bowel syndrome, unspecified type  - Improved. Continue dietary changes ( including off caffeine and alcohol) and prn Bentyl.   - dicyclomine (BENTYL) 20 MG tablet; Take 1 tablet by mouth 4 times daily as needed (abdominal cramping.)  Dispense: 90 tablet; Refill: 2    3. Lightheaded  - Encouraged to eat 3 meals per day again or at least protein snack when going > 3 hours between meals.   - Continue to push fluids - water. 4. [facility]'s disease with [facility]'s disease ([facility]  - Referred again to [facility] - Dr. Mandeep Krause, Dr. Soo Medrano, Dr. Ladonna Frazier, etc.   - Continue Sinemet and Namenda     5. Essential hypertension  - Controlled. Continue Lisinopril 20 mg qd , Atenolol 25 mg qd and low sodium diet. 6. Acquired hypothyroidism  - Continue Sythnroid 0.050 mg qd.     7. Needs flu shot  - Influenza, FLUAD, (age 72 y+), IM, Preservative Free, 0.5 mL     Follow up 3 months/ prn.

## 2022-10-25 ENCOUNTER — OFFICE VISIT (OUTPATIENT)
Dept: FAMILY MEDICINE CLINIC | Age: 87
End: 2022-10-25
Payer: MEDICARE

## 2022-10-25 VITALS
SYSTOLIC BLOOD PRESSURE: 110 MMHG | BODY MASS INDEX: 21.64 KG/M2 | RESPIRATION RATE: 14 BRPM | OXYGEN SATURATION: 97 % | DIASTOLIC BLOOD PRESSURE: 78 MMHG | WEIGHT: 100 LBS | HEART RATE: 60 BPM

## 2022-10-25 DIAGNOSIS — G30.9 ALZHEIMER'S DISEASE WITH PARKINSON'S DISEASE (HCC): ICD-10-CM

## 2022-10-25 DIAGNOSIS — K59.00 CONSTIPATION, UNSPECIFIED CONSTIPATION TYPE: ICD-10-CM

## 2022-10-25 DIAGNOSIS — Z86.73 HISTORY OF STROKE: ICD-10-CM

## 2022-10-25 DIAGNOSIS — F02.80 ALZHEIMER'S DISEASE WITH PARKINSON'S DISEASE (HCC): ICD-10-CM

## 2022-10-25 DIAGNOSIS — E03.9 ACQUIRED HYPOTHYROIDISM: ICD-10-CM

## 2022-10-25 DIAGNOSIS — K58.9 IRRITABLE BOWEL SYNDROME, UNSPECIFIED TYPE: ICD-10-CM

## 2022-10-25 DIAGNOSIS — E78.00 HYPERCHOLESTEROLEMIA: ICD-10-CM

## 2022-10-25 DIAGNOSIS — G20 ALZHEIMER'S DISEASE WITH PARKINSON'S DISEASE (HCC): ICD-10-CM

## 2022-10-25 DIAGNOSIS — C50.912 MALIGNANT NEOPLASM OF LEFT FEMALE BREAST, UNSPECIFIED ESTROGEN RECEPTOR STATUS, UNSPECIFIED SITE OF BREAST (HCC): ICD-10-CM

## 2022-10-25 DIAGNOSIS — I10 ESSENTIAL HYPERTENSION: Primary | ICD-10-CM

## 2022-10-25 PROCEDURE — 1090F PRES/ABSN URINE INCON ASSESS: CPT | Performed by: FAMILY MEDICINE

## 2022-10-25 PROCEDURE — G8427 DOCREV CUR MEDS BY ELIG CLIN: HCPCS | Performed by: FAMILY MEDICINE

## 2022-10-25 PROCEDURE — 1036F TOBACCO NON-USER: CPT | Performed by: FAMILY MEDICINE

## 2022-10-25 PROCEDURE — G8420 CALC BMI NORM PARAMETERS: HCPCS | Performed by: FAMILY MEDICINE

## 2022-10-25 PROCEDURE — 1123F ACP DISCUSS/DSCN MKR DOCD: CPT | Performed by: FAMILY MEDICINE

## 2022-10-25 PROCEDURE — 99214 OFFICE O/P EST MOD 30 MIN: CPT | Performed by: FAMILY MEDICINE

## 2022-10-25 PROCEDURE — G8484 FLU IMMUNIZE NO ADMIN: HCPCS | Performed by: FAMILY MEDICINE

## 2022-10-25 NOTE — PROGRESS NOTES
Patient is here for review of medications. She got emesis for 24 hours about 3 weeks ago . She therefore stopped taking medications after that. No diarrhea. No fever now or prior. Her daughter, Xochilt Bowden, from [facility] is with her today, as well as her . She is having more bowel movements 2-3 per day and were usually once per day but loose. Appetite is good and going to dining hernandez. She was told appointment with Cheryl Hunt would be in  2/22, but did not make an appointment. Denies fever, chest pain , shortness of breath or cough. Weight is stable. Review of Systems    ROS: All other systems were reviewed and are negative . Patient's allergies and medications were reviewed. Patient's past medical, surgical, social , and family history were reviewed. Wt Readings from Last 3 Encounters:   10/25/22 100 lb (45.4 kg)   09/28/22 100 lb (45.4 kg)   08/31/22 97 lb 9.6 oz (44.3 kg)       OBJECTIVE:  /78   Pulse 60   Resp 14   Wt 100 lb (45.4 kg)   SpO2 97%   BMI 21.64 kg/m²     Physical Exam    General: NAD, cooperative, alert and oriented X 3. Mood / affect is good. good insight. well hydrated. Neck : no lymphadenopathy, supple, FROM  CV: Regular rate and rhythm , no murmurs/ rub/ gallop. No edema. Lungs : CTA bilaterally, breathing comfortably  Abdomen: positive bowel sounds, soft , non tender, non distended. No hepatosplenomegaly. No CVA tenderness. Skin: no rashes. Non tender. ASSESSMENT/  PLAN:  1. Essential hypertension  - Controlled. Continue Atenolol 25 mg qd , Lisinopril 20 mg qd and low sodium diet. 2. Hypercholesterolemia  - Controlled. Continue Lipitor 40 mg qd and low cholesterol diet. 3. Constipation, unspecified constipation type  - Decrease Senokot to 1 pill per day and if doing okay will stop Senokot and monitor given loose stools. 4. Acquired hypothyroidism  - Stable. Continue Synthroid 0.050 mg qd.      5. Alzheimer's disease with Parkinson's disease ([facility].)  - Stable. Continue Sinemet 25/100 - 1 po tid and Namenda 10 mg bid. - Will schedule an appointment with Neurologist at [facility] and then let my office know so we can try to move up appointment if needed. 6. Malignant neoplasm of left female breast, unspecified estrogen receptor status, unspecified site of breast ([facility].)  - Encouraged to discuss duration of Tamoxifen. If able to stop Tamoxifen , consider stopping Effexor. 7. History of stroke  - Continue medications. 8. Irritable bowel syndrome, unspecified type  - Recommended dietary/ lifestyle modifications and Bentyl prn.  - Weight is stable. Follow up 3 months/ prn.

## 2022-11-11 ENCOUNTER — TELEPHONE (OUTPATIENT)
Dept: FAMILY MEDICINE CLINIC | Age: 87
End: 2022-11-11

## 2022-11-11 RX ORDER — MEMANTINE HYDROCHLORIDE 10 MG/1
10 TABLET ORAL 2 TIMES DAILY
Qty: 60 TABLET | Refills: 5 | Status: SHIPPED | OUTPATIENT
Start: 2022-11-11

## 2022-11-11 RX ORDER — LISINOPRIL 20 MG/1
20 TABLET ORAL DAILY
Qty: 30 TABLET | Refills: 5 | Status: SHIPPED | OUTPATIENT
Start: 2022-11-11

## 2022-11-11 RX ORDER — ATORVASTATIN CALCIUM 40 MG/1
40 TABLET, FILM COATED ORAL NIGHTLY
Qty: 30 TABLET | Refills: 5 | Status: SHIPPED | OUTPATIENT
Start: 2022-11-11

## 2022-11-11 NOTE — TELEPHONE ENCOUNTER
Medication name:atorvastatin (LIPITOR) 40 MG tablet     Medication dose:  Frequency:Take 40 mg by mouth nightly  Quantity:30 tablet  Pharmacy name:[facility] [address] - [address] [facility] [address] [facility]   [address] [place] [address]   Phone:  [phone]  Fax:  [phone]  Pharmacy number:  Last OV:9/28/22  Last Labs:       Medication name:lisinopril (PRINIVIL;ZESTRIL) 20 MG tablet     Medication dose:  Frequency:  Take 20 mg by mouth daily           Quantity:90 tablets  Pharmacy name:[facility] [address] - [address] [facility], [address] [facility]   [address]   Phone:  [phone]  Fax:  [phone]  Pharmacy number:  Last OV:  Last Labs:       Medication name:memantine (NAMENDA) 10 MG tablet [    Medication dose:  Frequency:Take 10 mg by mouth 2 times daily  Quantity:60   Pharmacy name:[facility] [address] - [address] [facility] [address] [place] [address] [address]   [address] [place] [address]   Phone:  [phone]  Fax:  [phone]  Pharmacy number:  Last OV:  Last Labs:

## 2022-11-11 NOTE — TELEPHONE ENCOUNTER
Requested Prescriptions     Pending Prescriptions Disp Refills    atorvastatin (LIPITOR) 40 MG tablet 30 tablet 5     Sig: Take 1 tablet by mouth nightly    lisinopril (PRINIVIL;ZESTRIL) 20 MG tablet 30 tablet 5     Sig: Take 1 tablet by mouth daily    memantine (NAMENDA) 10 MG tablet 60 tablet 5     Sig: Take 1 tablet by mouth 2 times daily     Last ov 10/25/2022  Last labs 8/31/22

## 2022-11-16 ENCOUNTER — TELEPHONE (OUTPATIENT)
Dept: FAMILY MEDICINE CLINIC | Age: 87
End: 2022-11-16

## 2022-11-16 NOTE — TELEPHONE ENCOUNTER
Patient needs refill of the following meds:    carbidopa-levodopa (SINEMET)  MG per tablet [[id number]]     Order Details  Dose: 1 tablet Route: Oral Frequency: 3 TIMES DAILY   Dispense Quantity: -- Refills: --          Sig: Take 1 tablet by mouth 3 times daily         Start Date: 10/07/21 End Date: --   Written Date: -- Expiration Date: --   Ordering Date: 03/31/22     Source:  Received from: [address] [place] [address]   Providers    Authorizing Provider: Historical Provider, MD NPI: --   Documenting User:  Junior Wharton CMA        Please send to [address] [place] [address] [address] [place] [address]   Phone:  [phone]  Fax:  [phone]    Last ov: 10/25/2022  Last labs: 8/31/2022    Thank you

## 2022-11-16 NOTE — TELEPHONE ENCOUNTER
Her Sinemet is usually filled by Neurologist.  Please remind patient and spouse of this.   If problems filling let me know, but ideally this is filled by her neurologist.

## 2022-11-16 NOTE — TELEPHONE ENCOUNTER
Requested Prescriptions     Pending Prescriptions Disp Refills    carbidopa-levodopa (SINEMET)  MG per tablet 90 tablet      Sig: Take 1 tablet by mouth 3 times daily     Last ov: 10/25/2022  Last labs: 8/31/2022  Next ov 10/25/22

## 2023-01-18 NOTE — TELEPHONE ENCOUNTER
Requested Prescriptions     Pending Prescriptions Disp Refills    carbidopa-levodopa (SINEMET)  MG per tablet 90 tablet 1     Sig: Take 1 tablet by mouth 3 times daily     Last ov 10/25/22  Last lab 8/31/22  Next ov n/a

## 2023-01-18 NOTE — TELEPHONE ENCOUNTER
Patient needs refill of the following:     carbidopa-levodopa (SINEMET)  MG per tablet [[id number]]     Order Details  Dose: 1 tablet Route: Oral Frequency: 3 TIMES DAILY   Dispense Quantity: 90 tablet Refills: 1          Sig: Take 1 tablet by mouth 3 times daily         Start Date: 11/17/22 End Date: --   Written Date: 11/17/22 Expiration Date: 11/17/23   Original Order:  carbidopa-levodopa (SINEMET)  MG per tablet [[id number]]       Please send to [address] [place] [address] [address] - [facility] [address] Charlie Smith [phone]   [address] [place] [address] 4, [address] [facility]   Phone:  [phone]  Fax:  [phone]    Last ov: 10/25/2022  Last labs: 9/15/2022    Thank you

## 2023-01-24 ENCOUNTER — OFFICE VISIT (OUTPATIENT)
Dept: FAMILY MEDICINE CLINIC | Age: 88
End: 2023-01-24
Payer: MEDICARE

## 2023-01-24 ENCOUNTER — HOSPITAL ENCOUNTER (OUTPATIENT)
Dept: GENERAL RADIOLOGY | Age: 88
Discharge: HOME | End: 2023-01-24
Payer: MEDICARE

## 2023-01-24 VITALS
SYSTOLIC BLOOD PRESSURE: 165 MMHG | DIASTOLIC BLOOD PRESSURE: 80 MMHG | WEIGHT: 99.8 LBS | BODY MASS INDEX: 21.6 KG/M2 | OXYGEN SATURATION: 95 % | HEART RATE: 72 BPM

## 2023-01-24 DIAGNOSIS — M54.50 ACUTE LOW BACK PAIN WITHOUT SCIATICA, UNSPECIFIED BACK PAIN LATERALITY: ICD-10-CM

## 2023-01-24 DIAGNOSIS — G30.9 MILD ALZHEIMER'S DEMENTIA, UNSPECIFIED TIMING OF DEMENTIA ONSET, UNSPECIFIED WHETHER BEHAVIORAL, PSYCHOTIC, OR MOOD DISTURBANCE OR ANXIETY (HCC): ICD-10-CM

## 2023-01-24 DIAGNOSIS — R53.81 PHYSICAL DECONDITIONING: ICD-10-CM

## 2023-01-24 DIAGNOSIS — M25.551 HIP PAIN, BILATERAL: ICD-10-CM

## 2023-01-24 DIAGNOSIS — M25.552 HIP PAIN, BILATERAL: ICD-10-CM

## 2023-01-24 DIAGNOSIS — F02.A0 MILD ALZHEIMER'S DEMENTIA, UNSPECIFIED TIMING OF DEMENTIA ONSET, UNSPECIFIED WHETHER BEHAVIORAL, PSYCHOTIC, OR MOOD DISTURBANCE OR ANXIETY (HCC): ICD-10-CM

## 2023-01-24 DIAGNOSIS — M25.552 HIP PAIN, BILATERAL: Primary | ICD-10-CM

## 2023-01-24 DIAGNOSIS — I10 ESSENTIAL HYPERTENSION: ICD-10-CM

## 2023-01-24 DIAGNOSIS — M25.551 HIP PAIN, BILATERAL: Primary | ICD-10-CM

## 2023-01-24 PROBLEM — S32.010A CLOSED COMPRESSION FRACTURE OF L1 VERTEBRA (HCC): Status: ACTIVE | Noted: 2023-01-01

## 2023-01-24 PROBLEM — M51.36 LUMBAR DEGENERATIVE DISC DISEASE: Status: ACTIVE | Noted: 2023-01-01

## 2023-01-24 PROCEDURE — G8420 CALC BMI NORM PARAMETERS: HCPCS | Performed by: FAMILY MEDICINE

## 2023-01-24 PROCEDURE — 72100 X-RAY EXAM L-S SPINE 2/3 VWS: CPT

## 2023-01-24 PROCEDURE — 73522 X-RAY EXAM HIPS BI 3-4 VIEWS: CPT

## 2023-01-24 PROCEDURE — 1090F PRES/ABSN URINE INCON ASSESS: CPT | Performed by: FAMILY MEDICINE

## 2023-01-24 PROCEDURE — 99214 OFFICE O/P EST MOD 30 MIN: CPT | Performed by: FAMILY MEDICINE

## 2023-01-24 PROCEDURE — 1036F TOBACCO NON-USER: CPT | Performed by: FAMILY MEDICINE

## 2023-01-24 PROCEDURE — G8427 DOCREV CUR MEDS BY ELIG CLIN: HCPCS | Performed by: FAMILY MEDICINE

## 2023-01-24 PROCEDURE — 1123F ACP DISCUSS/DSCN MKR DOCD: CPT | Performed by: FAMILY MEDICINE

## 2023-01-24 PROCEDURE — G8484 FLU IMMUNIZE NO ADMIN: HCPCS | Performed by: FAMILY MEDICINE

## 2023-01-24 RX ORDER — LISINOPRIL 40 MG/1
40 TABLET ORAL DAILY
Qty: 30 TABLET | Refills: 1 | Status: SHIPPED | OUTPATIENT
Start: 2023-01-24

## 2023-01-24 NOTE — PROGRESS NOTES
Patient is here for fall in bathroom on 1/19/23 . She is continuing to have some pain . No radiation to legs. Pain is 6/10 when flares and  5/10 now. Prolonged sitting seems to aggravate the pain . Lying down is better . At times walking aggravates the pain. She did okay with coming into the office today. Sleeping relatively okay at night. Denies missed medications. She has h/o Alzheimer's with some deconditioning. Denies fever, chest pain , shortness of breath or cough. Review of Systems    ROS: All other systems were reviewed and are negative . Patient's allergies and medications were reviewed. Patient's past medical, surgical, social , and family history were reviewed. BP Readings from Last 3 Encounters:   01/24/23 (!) 182/91   10/25/22 110/78   09/28/22 130/60       OBJECTIVE:  BP (!) 165/80   Pulse 72   Wt 99 lb 12.8 oz (45.3 kg)   SpO2 95%   BMI 21.60 kg/m²     Physical Exam    General: NAD, cooperative, alert and oriented X 3. Mood / affect is good. good insight. well hydrated. Neck : no lymphadenopathy, supple, FROM  CV: Regular rate and rhythm , no murmurs/ rub/ gallop. No edema. Lungs : CTA bilaterally, breathing comfortably  Abdomen: positive bowel sounds, soft , non tender, non distended. No hepatosplenomegaly. No CVA tenderness. Back: mild pain with flexion. Mild pain with rotation left. Minimal lumbar tenderness. No erythema or edema or ecchymosis. Lower extremities : DTRs 2+ knees and ankles bilateral.  Tight hamstrings bilateral. Strength 5/5. Negative straight leg-raise. No edema or erythema bilateral. Mild pain with hip abduction with tightness. Skin: no rashes. Non tender. ASSESSMENT/  PLAN:  1. Hip pain, bilateral  - Moist heat . ROM exercises. Modify activities. - XR HIP 3-4 VW W PELVIS BILATERAL; Future and follow up after completed/ prn.     2. Acute low back pain without sciatica, unspecified back pain laterality  - Moist heat . ROM exercises.   Modify activities. - XR LUMBAR SPINE (2-3 VIEWS); Future and follow up after completed/ prn.   - Tylenol prn.     3. Essential hypertension  - Uncontrolled. Increase Lisinopril 40 mg qd   - Follow low sodium diet. Avoid caffeine , alcohol and decongestants. - lisinopril (PRINIVIL;ZESTRIL) 40 MG tablet; Take 1 tablet by mouth daily  Dispense: 30 tablet; Refill: 1   - Encouraged to monitor blood pressure at different times of day and bring monitor and log into follow up appointment . 4. Mild Alzheimer's dementia, unspecified timing of dementia onset, unspecified whether behavioral, psychotic, or mood disturbance or anxiety ([facility] [address]  - Stable. 5. Physical deconditioning  - Stable. May benefit from Physical Therapy . Follow up 4 -6 weeks/ prn.

## 2023-01-26 ENCOUNTER — TELEPHONE (OUTPATIENT)
Dept: FAMILY MEDICINE CLINIC | Age: 88
End: 2023-01-26

## 2023-01-26 RX ORDER — VENLAFAXINE HYDROCHLORIDE 75 MG/1
75 CAPSULE, EXTENDED RELEASE ORAL DAILY
Qty: 90 CAPSULE | Refills: 1 | Status: SHIPPED | OUTPATIENT
Start: 2023-01-26

## 2023-01-26 NOTE — TELEPHONE ENCOUNTER
Patient  Dyan Khoury) requesting a refill. Thanks      venlafaxine (EFFEXOR XR) 75 MG extended release capsule [[id number]]     Order Details  Dose: 75 mg Route: Oral Frequency: DAILY  Dispense Quantity: 90 capsule Refills: 1        Sig: Take 1 capsule by mouth daily       Start Date: 07/12/22 End Date: --  Written Date: 07/12/22 Expiration Date: 07/12/23  Providers    Authorizing Provider: Alejandro Carcamo MD NPI: [id number]  Ordering User:   Alejandro Carcamo MD        Pharmacy    [facility] [address] [place] [id number]   Phone:  [phone]  Fax:  [phone]

## 2023-01-26 NOTE — TELEPHONE ENCOUNTER
Requested Prescriptions     Pending Prescriptions Disp Refills    venlafaxine (EFFEXOR XR) 75 MG extended release capsule 90 capsule 1     Sig: Take 1 capsule by mouth daily     Last ov 1/24/22  Last lab 8/31/22  Next ov 2/20/23

## 2023-01-26 NOTE — TELEPHONE ENCOUNTER
Josr Gonzalez with [facility] calling for a verbal order for PT/OT please call Josr Gonzalez [phone].  Thanks

## 2023-01-26 NOTE — TELEPHONE ENCOUNTER
Latasha Amarjit from [address] [place] [address] care is stating that her insurance is requiring a billable diagnosis for home physical therapy. They suggested alzheimers. Please call nurse back to clarify so they can get therapy started.     Thank you

## 2023-01-27 ENCOUNTER — TELEPHONE (OUTPATIENT)
Dept: FAMILY MEDICINE CLINIC | Age: 88
End: 2023-01-27

## 2023-01-27 NOTE — TELEPHONE ENCOUNTER
Morena Hancock got order for OT/ PT but can't get it approved because they need actual diagnosis of alzheimer on the most recent office notes at the top where the visit note is written. If that can be changed Morena Hancock says she can pull it from Epic and get it approved. If you have any questions please call Morena Hancock at [phone].

## 2023-01-27 NOTE — TELEPHONE ENCOUNTER
I tried putting the codes and alzheimer's in the note but when it prints is does not show. Would you have to addendum the progress notes for it to show up?     Thank you

## 2023-02-06 ENCOUNTER — TELEPHONE (OUTPATIENT)
Dept: FAMILY MEDICINE CLINIC | Age: 88
End: 2023-02-06

## 2023-02-06 RX ORDER — TAMOXIFEN CITRATE 20 MG/1
20 TABLET ORAL DAILY
Qty: 90 TABLET | Refills: 1 | Status: CANCELLED | OUTPATIENT
Start: 2023-02-06

## 2023-02-06 NOTE — TELEPHONE ENCOUNTER
Tamoxifen usually is prescribed by breast surgeon or oncologist. Please inquire who she is seeing and who has prescribed this in the past for her.

## 2023-02-06 NOTE — TELEPHONE ENCOUNTER
Patient would like 90 day refill if possible.     tamoxifen (NOLVADEX) 20 MG tablet [[id number]]     Order Details  Dose: 20 mg Route: Oral Frequency: DAILY   Dispense Quantity: -- Refills: --          Sig: Take 20 mg by mouth daily   [facility] [address] - [address] - P [phone] - F [phone]     1/24/23  9/15/22

## 2023-02-07 NOTE — TELEPHONE ENCOUNTER
It was prescribed by a Oncologist in [facility] her  will try to find out by who and give us a call back with that information.

## 2023-02-07 NOTE — TELEPHONE ENCOUNTER
Patient is calling back with information of oncologist. Babita Chang name is Luz Marina Vee with [facility] in [place], [place]. Phone# [phone]. Thank you.

## 2023-02-08 NOTE — TELEPHONE ENCOUNTER
Pt  states they do not have an oncologist or breast surgeon right now.  He states they need the medication filled or Jigna Fulton will just stop taking it until they find a doctor

## 2023-02-10 NOTE — TELEPHONE ENCOUNTER
Given she is > 10 years out from her breast cancer and her age, it is fine for her to stop Tamoxifen. Please inform patient and Lorena Murillo.

## 2023-02-13 ENCOUNTER — TELEPHONE (OUTPATIENT)
Dept: FAMILY MEDICINE CLINIC | Age: 88
End: 2023-02-13

## 2023-02-13 NOTE — TELEPHONE ENCOUNTER
Patient's  (Bertrand)called request a refill.        Medication  atenolol (TENORMIN) 25 MG tablet [[id number]]  atenolol (TENORMIN) 25 MG tablet [[id number]]     Order Details  Dose: 25 mg Route: Oral Frequency: DAILY  Dispense Quantity: -- Refills: --        Sig: Take 25 mg by mouth daily       Start Date: 10/25/21 End Date: --  Written Date: -- Expiration Date: --  Ordering Date: 03/31/22    Source:  Received from: [facility]  Providers    Authorizing Provider: Historical Provider, MD NPI: --  Documenting User:  [facility] [id number] - [address] [place] Miguel   [address] [place] [id number]   Phone:  [phone]  Fax:  [phone]

## 2023-02-13 NOTE — TELEPHONE ENCOUNTER
Patient's  is asking for 3 refills because they will not be able to follow the doctor into her new practice.  Thanks

## 2023-02-15 RX ORDER — ATENOLOL 25 MG/1
25 TABLET ORAL DAILY
Qty: 30 TABLET | Refills: 3 | Status: SHIPPED | OUTPATIENT
Start: 2023-02-15

## 2023-02-16 ENCOUNTER — TELEPHONE (OUTPATIENT)
Dept: FAMILY MEDICINE CLINIC | Age: 88
End: 2023-02-16

## 2023-02-16 DIAGNOSIS — K58.9 IRRITABLE BOWEL SYNDROME, UNSPECIFIED TYPE: ICD-10-CM

## 2023-02-16 DIAGNOSIS — R10.31 RIGHT LOWER QUADRANT ABDOMINAL PAIN: ICD-10-CM

## 2023-02-16 RX ORDER — DICYCLOMINE HCL 20 MG
20 TABLET ORAL 4 TIMES DAILY PRN
Qty: 90 TABLET | Refills: 0 | Status: SHIPPED | OUTPATIENT
Start: 2023-02-16

## 2023-02-16 NOTE — TELEPHONE ENCOUNTER
Requested Prescriptions     Pending Prescriptions Disp Refills    dicyclomine (BENTYL) 20 MG tablet 90 tablet 2     Sig: Take 1 tablet by mouth 4 times daily as needed (abdominal cramping.)     Last ov 1/24/2023  Last labs 8/31/22

## 2023-02-16 NOTE — TELEPHONE ENCOUNTER
Patient (Bertrand) called requesting a refill. Thanks      dicyclomine (BENTYL) 20 MG tablet [[id number]]     Order Details  Dose: 20 mg Route: Oral Frequency: 4 TIMES DAILY PRN for abdominal cramping. Dispense Quantity: 90 tablet Refills: 2        Sig: Take 1 tablet by mouth 4 times daily as needed (abdominal cramping.)       Start Date: 09/28/22 End Date: --  Written Date: 09/28/22 Expiration Date: 09/28/23     Diagnosis Association: Irritable bowel syndrome, unspecified type (K58.9); Right lower quadrant abdominal pain (R10.31)  Original Order:  dicyclomine (BENTYL) 20 MG tablet [[id number]]  Providers    Authorizing Provider: Jake Aguirre MD NPI: [id number]  Ordering User:   Jake Aguirre MD        Pharmacy    [facility] [address] [place] [id number]   Phone:  [phone]  Fax:  [phone]

## 2023-02-20 ENCOUNTER — OFFICE VISIT (OUTPATIENT)
Dept: FAMILY MEDICINE CLINIC | Age: 88
End: 2023-02-20
Payer: MEDICARE

## 2023-02-20 VITALS
BODY MASS INDEX: 21.01 KG/M2 | WEIGHT: 97.4 LBS | OXYGEN SATURATION: 94 % | DIASTOLIC BLOOD PRESSURE: 62 MMHG | HEART RATE: 58 BPM | SYSTOLIC BLOOD PRESSURE: 122 MMHG | HEIGHT: 57 IN

## 2023-02-20 DIAGNOSIS — I10 ESSENTIAL HYPERTENSION: ICD-10-CM

## 2023-02-20 DIAGNOSIS — S32.010A CLOSED COMPRESSION FRACTURE OF L1 VERTEBRA, INITIAL ENCOUNTER (HCC): ICD-10-CM

## 2023-02-20 DIAGNOSIS — G30.9 ALZHEIMER'S DISEASE WITH PARKINSON'S DISEASE (HCC): ICD-10-CM

## 2023-02-20 DIAGNOSIS — F02.80 ALZHEIMER'S DISEASE WITH PARKINSON'S DISEASE (HCC): ICD-10-CM

## 2023-02-20 DIAGNOSIS — E78.00 HYPERCHOLESTEROLEMIA: ICD-10-CM

## 2023-02-20 DIAGNOSIS — E03.9 ACQUIRED HYPOTHYROIDISM: ICD-10-CM

## 2023-02-20 DIAGNOSIS — M51.36 LUMBAR DEGENERATIVE DISC DISEASE: Primary | ICD-10-CM

## 2023-02-20 DIAGNOSIS — R63.4 WEIGHT LOSS: ICD-10-CM

## 2023-02-20 DIAGNOSIS — R47.9 DIFFICULTY WITH SPEECH: ICD-10-CM

## 2023-02-20 DIAGNOSIS — G20 ALZHEIMER'S DISEASE WITH PARKINSON'S DISEASE (HCC): ICD-10-CM

## 2023-02-20 DIAGNOSIS — R53.81 PHYSICAL DECONDITIONING: ICD-10-CM

## 2023-02-20 PROCEDURE — 1090F PRES/ABSN URINE INCON ASSESS: CPT | Performed by: FAMILY MEDICINE

## 2023-02-20 PROCEDURE — 1036F TOBACCO NON-USER: CPT | Performed by: FAMILY MEDICINE

## 2023-02-20 PROCEDURE — G8484 FLU IMMUNIZE NO ADMIN: HCPCS | Performed by: FAMILY MEDICINE

## 2023-02-20 PROCEDURE — G8427 DOCREV CUR MEDS BY ELIG CLIN: HCPCS | Performed by: FAMILY MEDICINE

## 2023-02-20 PROCEDURE — 1123F ACP DISCUSS/DSCN MKR DOCD: CPT | Performed by: FAMILY MEDICINE

## 2023-02-20 PROCEDURE — G8420 CALC BMI NORM PARAMETERS: HCPCS | Performed by: FAMILY MEDICINE

## 2023-02-20 PROCEDURE — 99214 OFFICE O/P EST MOD 30 MIN: CPT | Performed by: FAMILY MEDICINE

## 2023-02-20 SDOH — ECONOMIC STABILITY: FOOD INSECURITY: WITHIN THE PAST 12 MONTHS, YOU WORRIED THAT YOUR FOOD WOULD RUN OUT BEFORE YOU GOT MONEY TO BUY MORE.: NEVER TRUE

## 2023-02-20 SDOH — ECONOMIC STABILITY: FOOD INSECURITY: WITHIN THE PAST 12 MONTHS, THE FOOD YOU BOUGHT JUST DIDN'T LAST AND YOU DIDN'T HAVE MONEY TO GET MORE.: NEVER TRUE

## 2023-02-20 SDOH — ECONOMIC STABILITY: INCOME INSECURITY: HOW HARD IS IT FOR YOU TO PAY FOR THE VERY BASICS LIKE FOOD, HOUSING, MEDICAL CARE, AND HEATING?: NOT HARD AT ALL

## 2023-02-20 SDOH — ECONOMIC STABILITY: HOUSING INSECURITY
IN THE LAST 12 MONTHS, WAS THERE A TIME WHEN YOU DID NOT HAVE A STEADY PLACE TO SLEEP OR SLEPT IN A SHELTER (INCLUDING NOW)?: NO

## 2023-02-20 ASSESSMENT — PATIENT HEALTH QUESTIONNAIRE - PHQ9
SUM OF ALL RESPONSES TO PHQ9 QUESTIONS 1 & 2: 0
1. LITTLE INTEREST OR PLEASURE IN DOING THINGS: 0
SUM OF ALL RESPONSES TO PHQ QUESTIONS 1-9: 0
2. FEELING DOWN, DEPRESSED OR HOPELESS: 0
SUM OF ALL RESPONSES TO PHQ QUESTIONS 1-9: 0

## 2023-02-20 NOTE — PROGRESS NOTES
Patient is here for L1 compression - mild and unsure per xray if old or new. [facility] was trying to provide Physical Therapy . Sleeping okay now. Normal bowel movements q 1-2 times per day to qod. Takes Tylenol 2-3 times per day . Her significant other is noticing she is having mumbled speech off and on . Some word finding issues throughout the day . H/o Alzheimer's. No specific arm or leg weakness. More generalized  weakness. Denies fever, chest pain , shortness of breath or cough. Review of Systems    ROS: All other systems were reviewed and are negative . Patient's allergies and medications were reviewed. Patient's past medical, surgical, social , and family history were reviewed. Wt Readings from Last 3 Encounters:   02/20/23 97 lb 6.4 oz (44.2 kg)   01/24/23 99 lb 12.8 oz (45.3 kg)   10/25/22 100 lb (45.4 kg)       OBJECTIVE:  /62 (Site: Left Upper Arm, Position: Sitting, Cuff Size: Medium Adult)   Pulse 58   Ht 4' 9\" (1.448 m)   Wt 97 lb 6.4 oz (44.2 kg)   SpO2 94%   BMI 21.08 kg/m²     Physical Exam    General: NAD, cooperative, alert and oriented X 3. Mood / affect is good. good insight. well hydrated. Neck : no lymphadenopathy, supple, FROM  CV: Regular rate and rhythm , no murmurs/ rub/ gallop. No edema. Lungs : CTA bilaterally, breathing comfortably  Abdomen: positive bowel sounds, soft , non tender, non distended. No hepatosplenomegaly. No CVA tenderness. Skin: no rashes. Non tender. ASSESSMENT/  PLAN:  1. Lumbar degenerative disc disease  - Moist heat . ROM exercises. Modify activities. - Ambulatory referral to Physical Therapy    2. Closed compression fracture of L1 vertebra, initial encounter ([facility])  - Moist heat . ROM exercises. Modify activities. - Tylenol prn pain. Pain improved. - Ambulatory referral to Physical Therapy    3. Physical deconditioning  - ROM exercises. Modify activities.    - Ambulatory referral to Physical Therapy    4. Alzheimer's disease with Parkinson's disease ([facility] [address]  - Stable. Continue Namenda 10 mg bid and Sinemet tid. - Followed by Neurologist.   - Ambulatory referral to Physical Therapy  - External Referral To Speech Therapy    5. Difficulty with speech  - External Referral To Speech Therapy    6. Essential hypertension  - Controlled. Continue Lisinopril 40 mg qd , Atenolol 25 mg qd and low sodium diet. 7. Hypercholesterolemia  - Controlled. Continue Lipitor 40 mg qd and low cholesterol diet. - Comprehensive Metabolic Panel; Future  - Lipid Panel; Future    8. Acquired hypothyroidism  - Stable. Continue Synthroid 0.050 mg qd.   - TSH; Future  - T4, Free; Future    9. Weight loss  - Stable. Continue with protein shake supplement 1-2 cans per day . Follow up in 5-6 weeks/ prn.

## 2023-02-23 RX ORDER — CLOPIDOGREL BISULFATE 75 MG/1
75 TABLET ORAL DAILY
Qty: 30 TABLET | Refills: 1 | Status: SHIPPED | OUTPATIENT
Start: 2023-02-23

## 2023-02-23 NOTE — PROGRESS NOTES
Last ov 2-20-23  Has NP with Dr. Kiet Osborn 2-28-23  Requested Prescriptions     Pending Prescriptions Disp Refills    clopidogrel (PLAVIX) 75 MG tablet 30 tablet 2     Sig: Take 1 tablet by mouth daily

## 2023-02-28 ENCOUNTER — OFFICE VISIT (OUTPATIENT)
Dept: FAMILY MEDICINE CLINIC | Age: 88
End: 2023-02-28
Payer: MEDICARE

## 2023-02-28 VITALS
TEMPERATURE: 97.5 F | DIASTOLIC BLOOD PRESSURE: 70 MMHG | BODY MASS INDEX: 21.27 KG/M2 | WEIGHT: 98.6 LBS | HEIGHT: 57 IN | SYSTOLIC BLOOD PRESSURE: 130 MMHG

## 2023-02-28 DIAGNOSIS — E03.9 ACQUIRED HYPOTHYROIDISM: ICD-10-CM

## 2023-02-28 DIAGNOSIS — F02.80 ALZHEIMER'S DISEASE WITH PARKINSON'S DISEASE (HCC): Primary | ICD-10-CM

## 2023-02-28 DIAGNOSIS — I63.9 CEREBROVASCULAR ACCIDENT (CVA), UNSPECIFIED MECHANISM (HCC): ICD-10-CM

## 2023-02-28 DIAGNOSIS — G30.9 ALZHEIMER'S DISEASE WITH PARKINSON'S DISEASE (HCC): Primary | ICD-10-CM

## 2023-02-28 DIAGNOSIS — K58.9 IRRITABLE BOWEL SYNDROME, UNSPECIFIED TYPE: ICD-10-CM

## 2023-02-28 DIAGNOSIS — G20 ALZHEIMER'S DISEASE WITH PARKINSON'S DISEASE (HCC): Primary | ICD-10-CM

## 2023-02-28 DIAGNOSIS — I10 ESSENTIAL HYPERTENSION: ICD-10-CM

## 2023-02-28 DIAGNOSIS — E78.2 MIXED HYPERLIPIDEMIA: ICD-10-CM

## 2023-02-28 PROCEDURE — 1090F PRES/ABSN URINE INCON ASSESS: CPT | Performed by: FAMILY MEDICINE

## 2023-02-28 PROCEDURE — G8420 CALC BMI NORM PARAMETERS: HCPCS | Performed by: FAMILY MEDICINE

## 2023-02-28 PROCEDURE — 99214 OFFICE O/P EST MOD 30 MIN: CPT | Performed by: FAMILY MEDICINE

## 2023-02-28 PROCEDURE — G8484 FLU IMMUNIZE NO ADMIN: HCPCS | Performed by: FAMILY MEDICINE

## 2023-02-28 PROCEDURE — 1036F TOBACCO NON-USER: CPT | Performed by: FAMILY MEDICINE

## 2023-02-28 PROCEDURE — 1123F ACP DISCUSS/DSCN MKR DOCD: CPT | Performed by: FAMILY MEDICINE

## 2023-02-28 PROCEDURE — G8427 DOCREV CUR MEDS BY ELIG CLIN: HCPCS | Performed by: FAMILY MEDICINE

## 2023-03-02 PROBLEM — E78.2 MIXED HYPERLIPIDEMIA: Status: ACTIVE | Noted: 2022-03-31

## 2023-03-02 PROBLEM — I63.9 CVA (CEREBRAL VASCULAR ACCIDENT) (HCC): Status: ACTIVE | Noted: 2020-10-23

## 2023-03-02 PROBLEM — C50.911 INVASIVE DUCTAL CARCINOMA OF BREAST, FEMALE, RIGHT (HCC): Status: ACTIVE | Noted: 2023-03-02

## 2023-03-02 RX ORDER — LEVOTHYROXINE SODIUM 0.05 MG/1
50 TABLET ORAL
Qty: 90 TABLET | Refills: 1 | Status: SHIPPED | OUTPATIENT
Start: 2023-03-02

## 2023-03-02 NOTE — PROGRESS NOTES
Portions of this chart may have been created with voice recognition software. Occasional wrong-word or \"sound-alike\" substitutions may have occurred due to the inherent limitations of voice recognition software. Read the chart carefully and recognize, using context, where these substitutions have occurred      Chief Complaint     New Patient (Est.)       RAY Savage Junior is a 80 y.o. female here for establishing care with me as well as follow-up of chronic medical problems. Her primary historian is her . Patient has Alzheimer's disease with Parkinson's. She has very limited conversation. She denies any questions or concerns today.  states that all her medical problems are currently stable. Alzheimer's is worsening with time however she is on medications and continuing to take the medications as prescribed. History of stroke in the past continues to take Plavix and statin medications as prescribed. Blood pressures under control. No side effects noted from any medications. No chest pain palpitation shortness of breath lightheaded dizziness or any other significant symptoms. IBS is also under control with occasional dicyclomine use as needed for cramping. Also has a history of hypothyroidism. Will refill thyroid medications at this time. No other significant questions or concerns other than these. REVIEW OF SYSTEMS:  CONSTITUTIONAL: No weight loss, fever, positive for generalized weakness and fatigue. HEENT:No sore throat, runny nose, earache. No vision or hearing disturbances   CARDIOVASCULAR: No chest pain,No palpitations or edema. RESPIRATORY : No shortness of breath, cough. GASTROINTESTINAL: No nausea, vomiting, diarrhea or constipation. No abdominal pain. GENITOURINARY:No dysuria, urgency, frequency, hematuria. NEUROLOGICAL: No headache, dizziness or syncope.    MUSCULOSKELETAL: Some generalized stiffness and pain in all extremities however controlled and manageable  PSYCHIATRIC : No mood changes, worsening memory, no behavioral changes so far  SKIN: No rash or itching.       Lab Results   Component Value Date    WBC 6.0 08/31/2022    HGB 13.1 08/31/2022    HCT 37.9 08/31/2022    .2 (H) 08/31/2022     08/31/2022      No results found for: LABA1C  No results found for: EAG  Lab Results   Component Value Date    TSH 0.68 02/21/2023     Lab Results   Component Value Date    CHOL 159 02/21/2023     Lab Results   Component Value Date    TRIG 81 02/21/2023     Lab Results   Component Value Date    HDL 84 (H) 02/21/2023     Lab Results   Component Value Date    LDLCALC 59 02/21/2023     Lab Results   Component Value Date    LABVLDL 16 02/21/2023     No results found for: [facility]   Lab Results   Component Value Date     02/21/2023    K 3.9 02/21/2023     02/21/2023    CO2 30 02/21/2023    BUN 14 02/21/2023    CREATININE 0.8 02/21/2023    GLUCOSE 88 02/21/2023    CALCIUM 8.8 02/21/2023    PROT 6.4 02/21/2023    LABALBU 3.8 02/21/2023    BILITOT 0.5 02/21/2023    ALKPHOS 62 02/21/2023    AST 16 02/21/2023    ALT <5 (L) 02/21/2023    LABGLOM >60 02/21/2023    GFRAA 51 (A) 08/31/2022    AGRATIO 1.5 02/21/2023           Current Outpatient Medications   Medication Sig Dispense Refill    levothyroxine (SYNTHROID) 50 MCG tablet Take 1 tablet by mouth every morning (before breakfast) 90 tablet 1    clopidogrel (PLAVIX) 75 MG tablet Take 1 tablet by mouth daily 30 tablet 1    dicyclomine (BENTYL) 20 MG tablet Take 1 tablet by mouth 4 times daily as needed (abdominal cramping.) 90 tablet 0    atenolol (TENORMIN) 25 MG tablet Take 1 tablet by mouth daily 30 tablet 3    venlafaxine (EFFEXOR XR) 75 MG extended release capsule Take 1 capsule by mouth daily 90 capsule 1    lisinopril (PRINIVIL;ZESTRIL) 40 MG tablet Take 1 tablet by mouth daily 30 tablet 1    carbidopa-levodopa (SINEMET)  MG per tablet Take 1 tablet by mouth 3 times daily 90 tablet 1 atorvastatin (LIPITOR) 40 MG tablet Take 1 tablet by mouth nightly 30 tablet 5    memantine (NAMENDA) 10 MG tablet Take 1 tablet by mouth 2 times daily 60 tablet 5    Calcium Carbonate-Vitamin D (OYSTER SHELL CALCIUM/D) 500-200 MG-UNIT TABS Take 2 tablets by mouth daily      senna (SENOKOT) 8.6 MG tablet Take 2 tablets by mouth daily      Multiple Vitamins-Minerals (MULTIVITAMIN ADULTS 50+ PO) Take by mouth       No current facility-administered medications for this visit. Allergies   Allergen Reactions    Codeine Nausea Only         Past Medical History:   Diagnosis Date    Acquired hypothyroidism     Alzheimer's disease with Parkinson's disease ([facility] [address]     Closed compression fracture of L1 vertebra ([facility] [address] 01/2023    Essential hypertension     History of stroke 10/2020    Hypercholesterolemia     Lumbar degenerative disc disease 01/2023    Malignant neoplasm of left female breast ([facility] [address] 2007    Other constipation     Wears hearing aid 2022         Past Surgical History:   Procedure Laterality Date    BREAST LUMPECTOMY Left 2007    BREAST LUMPECTOMY Right 2020    PORT SURGERY  07/2021    Port Removal         Family History   Problem Relation Age of Onset    Cancer Mother     Hypertension Father         Social History     Socioeconomic History    Marital status:      Spouse name: None    Number of children: None    Years of education: None    Highest education level: None   Tobacco Use    Smoking status: Never    Smokeless tobacco: Never   Substance and Sexual Activity    Alcohol use: Yes     Social Determinants of Health     Financial Resource Strain: Low Risk     Difficulty of Paying Living Expenses: Not hard at all   Food Insecurity: No Food Insecurity    Worried About Running Out of Food in the Last Year: Never true    Ran Out of Food in the Last Year: Never true   Transportation Needs: Unknown    Lack of Transportation (Non-Medical):  No   Physical Activity: Inactive    Days of Exercise per Week: 0 days Minutes of Exercise per Session: 0 min   Housing Stability: Unknown    Unstable Housing in the Last Year: No          OBJECTIVE:      Vitals:    02/28/23 1220   BP: 130/70   Temp: 97.5 °F (36.4 °C)   Weight: 98 lb 9.6 oz (44.7 kg)   Height: 4' 9\" (1.448 m)       Constitutional: Patient appears well-nourished, not in any distress. HENT:  Head: Normocephalic and atraumatic. Eyes: Conjunctivae and EOM are normal  Right Ear: External ear normal.  Left Ear: External ear normal.   Nose: Nose normal.  Mouth/Throat: Oropharynx is clear and moist.   Neck: Normal range of motion. Neck supple. Lymphatic: No cervical lymphadenopathy  Cardiovascular: Normal rate, regular rhythm, normal heart sounds and intact distal pulses. Pulmonary/Chest: Effort normal and breath sounds normal, no wheezes or rhonchi. Neurological:Patient is alert, oriented to person, place, and time. No obvious focal neurological deficits  Skin: Skin is warm and moist.  Psychiatric:Patient has a normal mood and affect, behavior is normal.       ASSESSMENT AND PLAN    Saritha was seen today for new patient. Diagnoses and all orders for this visit:    Alzheimer's disease with Parkinson's disease ([address]    Cerebrovascular accident (CVA), unspecified mechanism ([facility] [address]    Essential hypertension    Mixed hyperlipidemia    Irritable bowel syndrome, unspecified type    Acquired hypothyroidism    Other orders  -     levothyroxine (SYNTHROID) 50 MCG tablet; Take 1 tablet by mouth every morning (before breakfast)           DISCHARGE MEDICATION LIST        Medication List            Accurate as of February 28, 2023 11:59 PM. If you have any questions, ask your nurse or doctor.                 CONTINUE taking these medications      atenolol 25 MG tablet  Commonly known as: TENORMIN  Take 1 tablet by mouth daily     atorvastatin 40 MG tablet  Commonly known as: LIPITOR  Take 1 tablet by mouth nightly     carbidopa-levodopa  MG per tablet  Commonly known as: SINEMET  Take 1 tablet by mouth 3 times daily     clopidogrel 75 MG tablet  Commonly known as: PLAVIX  Take 1 tablet by mouth daily     dicyclomine 20 MG tablet  Commonly known as: BENTYL  Take 1 tablet by mouth 4 times daily as needed (abdominal cramping.)     levothyroxine 50 MCG tablet  Commonly known as: SYNTHROID  Take 1 tablet by mouth every morning (before breakfast)     lisinopril 40 MG tablet  Commonly known as: PRINIVIL;ZESTRIL  Take 1 tablet by mouth daily     memantine 10 MG tablet  Commonly known as: NAMENDA  Take 1 tablet by mouth 2 times daily     MULTIVITAMIN ADULTS 50+ PO     Oyster Shell Calcium/D 500-200 MG-UNIT Tabs     senna 8.6 MG tablet  Commonly known as: SENOKOT     venlafaxine 75 MG extended release capsule  Commonly known as: Effexor XR  Take 1 capsule by mouth daily               Where to Get Your Medications        These medications were sent to [facility] - [facility] [address] - P [phone] - F [phone]  [facility] [address] [address]      Phone: [phone]   levothyroxine 50 MCG tablet          Return in about 3 months (around 5/28/2023), or if symptoms worsen or fail to improve, for medicare wellness.     Please refer to diagnosis, orders and patient instructions for further recommendations given.    All patient's questions and concerns were addressed appropriately.    All orders, handouts were reviewed in detail with the patient and patient voiced understanding verbally.    A total of 35 minutes was spent on today's patient encounter.    Time spent includes some or all of the following, both face-to-face time and non face-to-face time, but is not limited to:    [x] Preparing to see the patient and reviewing records  [] Discussion or coordination of care with other health care professionals  [x] Reviewing records or discussing history of plan with colleagues  [x] Obtaining and/or reviewing the history  [] Individual interpretation of  results not billed by me  [x] Performing a medically appropriate examination  [x] Counseling patient and/or caregiver  [] Ordering of unique Tests, Medications, Referrals, or Procedures  [x] Documentation within the EHR

## 2023-03-22 ENCOUNTER — OFFICE VISIT (OUTPATIENT)
Dept: FAMILY MEDICINE CLINIC | Age: 88
End: 2023-03-22
Payer: MEDICARE

## 2023-03-22 VITALS
WEIGHT: 99 LBS | SYSTOLIC BLOOD PRESSURE: 128 MMHG | TEMPERATURE: 97.3 F | BODY MASS INDEX: 21.36 KG/M2 | HEIGHT: 57 IN | DIASTOLIC BLOOD PRESSURE: 82 MMHG

## 2023-03-22 DIAGNOSIS — M20.41 HAMMER TOES, BILATERAL: ICD-10-CM

## 2023-03-22 DIAGNOSIS — M21.619 BUNION OF GREAT TOE: ICD-10-CM

## 2023-03-22 DIAGNOSIS — M20.42 HAMMER TOES, BILATERAL: ICD-10-CM

## 2023-03-22 DIAGNOSIS — I73.9 ARTERIAL INSUFFICIENCY OF LOWER EXTREMITY (HCC): Primary | ICD-10-CM

## 2023-03-22 DIAGNOSIS — L60.0 INGROWN TOENAIL OF BOTH FEET: ICD-10-CM

## 2023-03-22 DIAGNOSIS — L84 CALLUS OF TOE: ICD-10-CM

## 2023-03-22 PROCEDURE — 1090F PRES/ABSN URINE INCON ASSESS: CPT | Performed by: FAMILY MEDICINE

## 2023-03-22 PROCEDURE — G8484 FLU IMMUNIZE NO ADMIN: HCPCS | Performed by: FAMILY MEDICINE

## 2023-03-22 PROCEDURE — 1123F ACP DISCUSS/DSCN MKR DOCD: CPT | Performed by: FAMILY MEDICINE

## 2023-03-22 PROCEDURE — 1036F TOBACCO NON-USER: CPT | Performed by: FAMILY MEDICINE

## 2023-03-22 PROCEDURE — G8427 DOCREV CUR MEDS BY ELIG CLIN: HCPCS | Performed by: FAMILY MEDICINE

## 2023-03-22 PROCEDURE — G8420 CALC BMI NORM PARAMETERS: HCPCS | Performed by: FAMILY MEDICINE

## 2023-03-22 PROCEDURE — 99213 OFFICE O/P EST LOW 20 MIN: CPT | Performed by: FAMILY MEDICINE

## 2023-03-22 NOTE — PROGRESS NOTES
BENTYL  Take 1 tablet by mouth 4 times daily as needed (abdominal cramping.)     levothyroxine 50 MCG tablet  Commonly known as: SYNTHROID  Take 1 tablet by mouth every morning (before breakfast)     lisinopril 40 MG tablet  Commonly known as: PRINIVIL;ZESTRIL  Take 1 tablet by mouth daily     memantine 10 MG tablet  Commonly known as: NAMENDA  Take 1 tablet by mouth 2 times daily     MULTIVITAMIN ADULTS 50+ PO     Oyster Shell Calcium/D 500-200 MG-UNIT Tabs     senna 8.6 MG tablet  Commonly known as: SENOKOT     venlafaxine 75 MG extended release capsule  Commonly known as: Effexor XR  Take 1 capsule by mouth daily               Return if symptoms worsen or fail to improve. Please refer to diagnosis, orders and patient instructions for further recommendations given. All patient's questions and concerns were addressed appropriately. All orders, handouts were reviewed in detail with the patient and patient voiced understanding verbally.

## 2023-03-28 ENCOUNTER — HOSPITAL ENCOUNTER (OUTPATIENT)
Dept: VASCULAR LAB | Age: 88
Discharge: HOME | End: 2023-03-28
Payer: MEDICARE

## 2023-03-28 DIAGNOSIS — I73.9 ARTERIAL INSUFFICIENCY OF LOWER EXTREMITY (HCC): ICD-10-CM

## 2023-03-28 PROCEDURE — 93925 LOWER EXTREMITY STUDY: CPT

## 2023-04-06 DIAGNOSIS — R10.31 RIGHT LOWER QUADRANT ABDOMINAL PAIN: ICD-10-CM

## 2023-04-06 DIAGNOSIS — I10 ESSENTIAL HYPERTENSION: ICD-10-CM

## 2023-04-06 DIAGNOSIS — K58.9 IRRITABLE BOWEL SYNDROME, UNSPECIFIED TYPE: ICD-10-CM

## 2023-04-07 RX ORDER — LISINOPRIL 40 MG/1
40 TABLET ORAL DAILY
Qty: 90 TABLET | Refills: 1 | Status: SHIPPED | OUTPATIENT
Start: 2023-04-07

## 2023-04-07 RX ORDER — DICYCLOMINE HCL 20 MG
20 TABLET ORAL 2 TIMES DAILY PRN
Qty: 60 TABLET | Refills: 5 | Status: SHIPPED | OUTPATIENT
Start: 2023-04-07

## 2023-05-09 RX ORDER — CLOPIDOGREL BISULFATE 75 MG/1
75 TABLET ORAL DAILY
Qty: 90 TABLET | Refills: 1 | Status: SHIPPED | OUTPATIENT
Start: 2023-05-09

## 2023-05-11 RX ORDER — ATORVASTATIN CALCIUM 40 MG/1
40 TABLET, FILM COATED ORAL NIGHTLY
Qty: 90 TABLET | Refills: 1 | Status: SHIPPED | OUTPATIENT
Start: 2023-05-11

## 2023-05-19 ENCOUNTER — TELEPHONE (OUTPATIENT)
Dept: FAMILY MEDICINE CLINIC | Age: 88
End: 2023-05-19

## 2023-05-19 DIAGNOSIS — I10 ESSENTIAL HYPERTENSION: Primary | ICD-10-CM

## 2023-05-22 NOTE — TELEPHONE ENCOUNTER
Pt  Quiana Muro notified. There was confusion about who needed lab orders. I advised pt that when he comes in for his AWV on Wednesday, all orders for him will be handled then. He will also schedule AWV for pt at that time, declined to schedule now. At this time, there is no further questions or concerns from them.

## 2023-05-23 RX ORDER — MEMANTINE HYDROCHLORIDE 10 MG/1
10 TABLET ORAL 2 TIMES DAILY
Qty: 60 TABLET | Refills: 5 | Status: SHIPPED | OUTPATIENT
Start: 2023-05-23

## 2023-06-07 ENCOUNTER — TELEPHONE (OUTPATIENT)
Dept: FAMILY MEDICINE CLINIC | Age: 88
End: 2023-06-07

## 2023-06-07 NOTE — TELEPHONE ENCOUNTER
LMOM with phone number for [facility] [phone]. Referral has been faxed over.  Advised pt to callback with  any questions or concerns

## 2023-06-14 NOTE — TELEPHONE ENCOUNTER
Advised Bertrand (EC). Patient is currently refusing morning labs. Explained to patient the  importance for morning labs to be done. Patient continued to refuse. Will attempt again later.

## 2023-06-23 RX ORDER — ATENOLOL 25 MG/1
25 TABLET ORAL DAILY
Qty: 30 TABLET | Refills: 3 | Status: SHIPPED | OUTPATIENT
Start: 2023-06-23

## 2023-07-13 ENCOUNTER — TELEPHONE (OUTPATIENT)
Dept: FAMILY MEDICINE CLINIC | Age: 88
End: 2023-07-13

## 2023-07-20 RX ORDER — VENLAFAXINE HYDROCHLORIDE 75 MG/1
75 CAPSULE, EXTENDED RELEASE ORAL DAILY
Qty: 90 CAPSULE | Refills: 1 | Status: SHIPPED | OUTPATIENT
Start: 2023-07-20

## 2023-07-20 NOTE — TELEPHONE ENCOUNTER
Please call this line to see if a sooner appointment will be available and if needed I can join the call.

## 2023-08-06 ENCOUNTER — PATIENT MESSAGE (OUTPATIENT)
Dept: FAMILY MEDICINE CLINIC | Age: 88
End: 2023-08-06

## 2023-08-22 DIAGNOSIS — F02.B18 MODERATE DEMENTIA DUE TO PARKINSON'S DISEASE, WITH OTHER BEHAVIORAL DISTURBANCE (HCC): Primary | ICD-10-CM

## 2023-08-22 DIAGNOSIS — G20 PARKINSON'S DISEASE (HCC): ICD-10-CM

## 2023-08-22 DIAGNOSIS — G20 MODERATE DEMENTIA DUE TO PARKINSON'S DISEASE, WITH OTHER BEHAVIORAL DISTURBANCE (HCC): Primary | ICD-10-CM

## 2023-09-21 RX ORDER — LEVOTHYROXINE SODIUM 0.05 MG/1
50 TABLET ORAL
Qty: 90 TABLET | Refills: 1 | Status: SHIPPED | OUTPATIENT
Start: 2023-09-21

## 2023-10-25 ENCOUNTER — OFFICE VISIT (OUTPATIENT)
Dept: FAMILY MEDICINE CLINIC | Age: 88
End: 2023-10-25
Payer: MEDICARE

## 2023-10-25 VITALS
DIASTOLIC BLOOD PRESSURE: 72 MMHG | TEMPERATURE: 97.1 F | OXYGEN SATURATION: 97 % | BODY MASS INDEX: 21.49 KG/M2 | WEIGHT: 99.6 LBS | SYSTOLIC BLOOD PRESSURE: 110 MMHG | HEIGHT: 57 IN

## 2023-10-25 DIAGNOSIS — F02.80 ALZHEIMER'S DISEASE WITH PARKINSON'S DISEASE (HCC): ICD-10-CM

## 2023-10-25 DIAGNOSIS — K72.01 ACUTE LIVER FAILURE WITH HEPATIC COMA (HCC): ICD-10-CM

## 2023-10-25 DIAGNOSIS — G30.9 ALZHEIMER'S DISEASE WITH PARKINSON'S DISEASE (HCC): ICD-10-CM

## 2023-10-25 DIAGNOSIS — I48.19 PERSISTENT ATRIAL FIBRILLATION (HCC): ICD-10-CM

## 2023-10-25 DIAGNOSIS — G20 ALZHEIMER'S DISEASE WITH PARKINSON'S DISEASE (HCC): ICD-10-CM

## 2023-10-25 DIAGNOSIS — Z51.5 ENCOUNTER FOR ADMISSION TO HOSPICE CARE: ICD-10-CM

## 2023-10-25 DIAGNOSIS — F03.C11 SEVERE DEMENTIA WITH AGITATION, UNSPECIFIED DEMENTIA TYPE (HCC): Primary | ICD-10-CM

## 2023-10-25 PROCEDURE — G8427 DOCREV CUR MEDS BY ELIG CLIN: HCPCS | Performed by: FAMILY MEDICINE

## 2023-10-25 PROCEDURE — 99214 OFFICE O/P EST MOD 30 MIN: CPT | Performed by: FAMILY MEDICINE

## 2023-10-25 PROCEDURE — 1123F ACP DISCUSS/DSCN MKR DOCD: CPT | Performed by: FAMILY MEDICINE

## 2023-10-25 PROCEDURE — G8420 CALC BMI NORM PARAMETERS: HCPCS | Performed by: FAMILY MEDICINE

## 2023-10-25 PROCEDURE — 1036F TOBACCO NON-USER: CPT | Performed by: FAMILY MEDICINE

## 2023-10-25 PROCEDURE — G8484 FLU IMMUNIZE NO ADMIN: HCPCS | Performed by: FAMILY MEDICINE

## 2023-10-25 RX ORDER — DILTIAZEM HYDROCHLORIDE 120 MG/1
120 CAPSULE, COATED, EXTENDED RELEASE ORAL DAILY
Qty: 30 CAPSULE | Refills: 0 | COMMUNITY
Start: 2023-09-30 | End: 2023-10-30

## 2023-10-25 RX ORDER — POTASSIUM CHLORIDE 750 MG/1
TABLET, FILM COATED, EXTENDED RELEASE ORAL DAILY
COMMUNITY
Start: 2023-10-04

## 2023-10-25 RX ORDER — LACTULOSE 10 G/15ML
SOLUTION ORAL 3 TIMES DAILY
COMMUNITY
Start: 2023-09-29

## 2023-10-26 ENCOUNTER — PATIENT MESSAGE (OUTPATIENT)
Dept: FAMILY MEDICINE CLINIC | Age: 88
End: 2023-10-26

## 2024-01-03 ENCOUNTER — TELEPHONE (OUTPATIENT)
Dept: FAMILY MEDICINE CLINIC | Age: 89
End: 2024-01-03

## 2024-01-03 NOTE — TELEPHONE ENCOUNTER
[facility] called to apologize and let us know that they took care of the orders on their end and wont need to reach back out to us.

## 2024-01-03 NOTE — TELEPHONE ENCOUNTER
Have been receiving faxes from [facility] for orders-- MD did not plan on following pt while she was in hospice, wanted to defer care to hospice provider.    Advised Lissette of this. She believes a release of care form needs to be signed by MD but is checking to see what steps need to be taken by our office and will let us know.    Phone and fax number provided for her to fax over form or callback and let us know next steps. No further questions or concerns at this time

## 2024-01-19 NOTE — PROGRESS NOTES
Patient is a 80y.o. year old female presenting for a complete physical today. Last colonoscopy: ? Last DEXA: 11/10  Last mammogram: 6/20  Last PAP: ?   History of abnormal PAP: never  Last eye exam: 1 year ago  Current smoker: quit in 1990s  Exercise: not regular  Caffeine: 1 coffee/ day   Alcohol: 7/ week - wine with dinner    Moved from [place], [place] to [facility] in [place], [place] in early 11/21.  to [address] [place] [address] in 2003. She lives in [address] [place] [address] now, who asked for me to take as new patient. On Senna her bowel movements are qd or qod. Rarely straining. Gets some abdominal cramping 2-3 days per week. Often times relieved with bowel movement. At times cramps last > 1 hour and are usually in the am.  Eats breakfast daily - cereal , omelets,  [place]. Cramps occur after coffee and breakfast.      Urinary frequency at night mostly - 2-4 times per night she gets up . Some nosebleeds, but not frequently . Attributes to dry air may contribute. Patient has lost weight over several years, but weight is stable the past 6 months. Patient has 2 sons - 1 in [facility] and 1 in [place], [place].  2 daughters are in [address] [place] [address].  has 3 children: 1 daughter in [facility], 1 daughter in [place] and 1 son in [place]. Patient's allergies and medications were reviewed. Patient's past medical, surgical, social , and family history were reviewed. History reviewed. No pertinent past medical history.      Review of patient's past surgical history indicates:     Past Surgical History:   Procedure Laterality Date    BREAST LUMPECTOMY Bilateral                                                    Current Outpatient Medications   Medication Sig Dispense Refill    atenolol (TENORMIN) 25 MG tablet Take 25 mg by mouth daily      atorvastatin (LIPITOR) 40 MG tablet Take 40 mg by mouth nightly      Calcium Carbonate-Vitamin D (OYSTER SHELL CALCIUM/D) 500-200 MG-UNIT TABS Take 2 tablets by mouth daily      carbidopa-levodopa (SINEMET)  MG per tablet Take 1 tablet by mouth 3 times daily      clopidogrel (PLAVIX) 75 MG tablet Take 75 mg by mouth daily      levothyroxine (SYNTHROID) 50 MCG tablet Take 50 mcg by mouth every morning (before breakfast)      lisinopril (PRINIVIL;ZESTRIL) 20 MG tablet Take 20 mg by mouth daily      memantine (NAMENDA) 10 MG tablet Take 10 mg by mouth 2 times daily      senna (SENOKOT) 8.6 MG tablet Take 2 tablets by mouth daily      tamoxifen (NOLVADEX) 20 MG tablet Take 20 mg by mouth daily      venlafaxine (EFFEXOR XR) 75 MG extended release capsule Take 75 mg by mouth daily      Multiple Vitamins-Minerals (MULTIVITAMIN ADULTS 50+ PO) Take by mouth       No current facility-administered medications for this visit. Allergies   Allergen Reactions    Codeine Nausea Only       Social History     Tobacco Use    Smoking status: Never Smoker    Smokeless tobacco: Never Used   Substance Use Topics    Alcohol use: Yes    Drug use: Not on file        Family History   Problem Relation Age of Onset    Cancer Mother     Hypertension Father         ROS:  Feeling well. No dyspnea or chest pain on exertion. No abdominal pain, change in bowel habits, black or bloody stools. No urinary tract symptoms. No neurological complaints. See patient physical/  ROS questionnaire. OBJECTIVE:   /80   Pulse 64   Temp 96.9 °F (36.1 °C)   Resp 16   Ht 4' 8.5\" (1.435 m)   Wt 100 lb 3.2 oz (45.5 kg)   SpO2 97%   BMI 22.07 kg/m²   There were no vitals filed for this visit. The patient appears well, alert, oriented x 3, in no distress. HEENT : normocephalic, atraumatic, PERRLA, EOMI, tympanic membranes and nasopharynx are normal.  Neck supple. No adenopathy or thyromegaly. Upper extremities : DTRs 2+ biceps/ triceps/ brachioradialis bilateral.  FROM. Strength 5/5.    Lungs are clear, good air entry, no wheezes, rhonchi or rales. Breathing comfortably. Cardiovascular: regular rate and rhythm. S1 and S2 are normal, no murmurs,rubs, and gallops. No edema. Abdomen is soft without tenderness, guarding, mass or organomegaly. Normal bowel sounds. Back: non tender. FROM. negative straight leg-raise. Lower extremities : DTRs 2+ knees and ankles bilateral.  FROM. Strength 5/5. Negative straight leg-raise. No edema or erythema bilateral.  normal peripheral pulses. Neuro: Cranial nerves 2-12 are normal. Deep tendon reflexes are 2+ and equal to all extremities. No focal sensory, or motor deficit noted. Skin: no rashes or suspicious lesions. ASSESSMENT:   1. Alzheimer's disease with Parkinson's disease ([facility]  - Followed by Neurologist, Dr. Jacqui Urbina. - Continue Sinemet 25/100 tid and Namenda 10 mg bid     2. Essential hypertension  - Controlled. Continue Lisinopril 20 mg qd and Atenolol 25 mg qd and low sodium diet. - Comprehensive Metabolic Panel; Future  - CBC; Future    3. Acquired hypothyroidism  - Stable. Continue Synthroid 0.050 mg qd.   - TSH; Future  - T4, Free; Future    4. Hypercholesterolemia  - Controlled. Continue Lipitor 40 mg qd and low cholesterol diet. - Comprehensive Metabolic Panel; Future  - Lipid Panel; Future    5. Wears hearing aid  - Encouraged follow up with audiologist.     6. Malignant neoplasm of left female breast, unspecified estrogen receptor status, unspecified site of breast ([facility]  - Stable. Followed by Dr. Jeanine Li at [address] Tamoxifen qd and Effexor XR 75 mg qd. - CBC; Future    7. Other constipation  - Stable. Continue Senna qd. Push fluids -water and daily dietary fiber. 8. History of stroke  - Stable. Continue Plavix qd and Lipitor qd.    - Followed by Neurologist.     PLAN:   Follow a low fat, low cholesterol diet,  continue current healthy lifestyle patterns, including regular cardiovascular exercise >150 minutes per week,  and return for routine annual checkups  Yearly Attending Attestation (For Attendings USE Only)...